# Patient Record
Sex: FEMALE | Race: BLACK OR AFRICAN AMERICAN | NOT HISPANIC OR LATINO | Employment: UNEMPLOYED | RURAL
[De-identification: names, ages, dates, MRNs, and addresses within clinical notes are randomized per-mention and may not be internally consistent; named-entity substitution may affect disease eponyms.]

---

## 2021-05-04 DIAGNOSIS — M25.512 LEFT SHOULDER PAIN: Primary | ICD-10-CM

## 2021-05-18 ENCOUNTER — CLINICAL SUPPORT (OUTPATIENT)
Dept: REHABILITATION | Facility: HOSPITAL | Age: 66
End: 2021-05-18
Payer: MEDICARE

## 2021-05-18 DIAGNOSIS — M25.512 LEFT SHOULDER PAIN: Primary | ICD-10-CM

## 2021-05-18 DIAGNOSIS — G89.29 CHRONIC LEFT SHOULDER PAIN: ICD-10-CM

## 2021-05-18 DIAGNOSIS — M25.512 CHRONIC LEFT SHOULDER PAIN: ICD-10-CM

## 2021-05-18 DIAGNOSIS — R52 PAIN: ICD-10-CM

## 2021-05-18 DIAGNOSIS — R29.898 DECREASED STRENGTH OF UPPER EXTREMITY: ICD-10-CM

## 2021-05-18 PROCEDURE — 97530 THERAPEUTIC ACTIVITIES: CPT

## 2021-05-18 PROCEDURE — 97110 THERAPEUTIC EXERCISES: CPT

## 2021-05-19 ENCOUNTER — CLINICAL SUPPORT (OUTPATIENT)
Dept: REHABILITATION | Facility: HOSPITAL | Age: 66
End: 2021-05-19
Payer: MEDICARE

## 2021-05-19 DIAGNOSIS — R52 PAIN: ICD-10-CM

## 2021-05-19 DIAGNOSIS — R29.898 DECREASED STRENGTH OF UPPER EXTREMITY: ICD-10-CM

## 2021-05-19 PROCEDURE — 97530 THERAPEUTIC ACTIVITIES: CPT

## 2021-05-19 PROCEDURE — 97110 THERAPEUTIC EXERCISES: CPT

## 2021-05-24 ENCOUNTER — CLINICAL SUPPORT (OUTPATIENT)
Dept: REHABILITATION | Facility: HOSPITAL | Age: 66
End: 2021-05-24
Payer: MEDICARE

## 2021-05-24 DIAGNOSIS — R29.898 DECREASED STRENGTH OF UPPER EXTREMITY: ICD-10-CM

## 2021-05-24 DIAGNOSIS — R52 PAIN: ICD-10-CM

## 2021-05-24 PROCEDURE — 97530 THERAPEUTIC ACTIVITIES: CPT

## 2021-05-24 PROCEDURE — 97014 ELECTRIC STIMULATION THERAPY: CPT

## 2021-05-24 PROCEDURE — 97110 THERAPEUTIC EXERCISES: CPT

## 2021-05-27 ENCOUNTER — CLINICAL SUPPORT (OUTPATIENT)
Dept: REHABILITATION | Facility: HOSPITAL | Age: 66
End: 2021-05-27
Payer: MEDICARE

## 2021-05-27 DIAGNOSIS — R29.898 DECREASED STRENGTH OF UPPER EXTREMITY: ICD-10-CM

## 2021-05-27 DIAGNOSIS — R52 PAIN: ICD-10-CM

## 2021-05-27 PROCEDURE — 97014 ELECTRIC STIMULATION THERAPY: CPT

## 2021-05-27 PROCEDURE — 97110 THERAPEUTIC EXERCISES: CPT

## 2021-05-27 PROCEDURE — 97530 THERAPEUTIC ACTIVITIES: CPT

## 2021-06-01 ENCOUNTER — CLINICAL SUPPORT (OUTPATIENT)
Dept: REHABILITATION | Facility: HOSPITAL | Age: 66
End: 2021-06-01
Payer: MEDICARE

## 2021-06-01 DIAGNOSIS — R52 PAIN: ICD-10-CM

## 2021-06-01 DIAGNOSIS — R29.898 DECREASED STRENGTH OF UPPER EXTREMITY: ICD-10-CM

## 2021-06-01 PROCEDURE — 97110 THERAPEUTIC EXERCISES: CPT

## 2021-06-01 PROCEDURE — 97530 THERAPEUTIC ACTIVITIES: CPT

## 2021-09-09 DIAGNOSIS — M54.9 BACK PAIN: Primary | ICD-10-CM

## 2021-09-14 ENCOUNTER — CLINICAL SUPPORT (OUTPATIENT)
Dept: REHABILITATION | Facility: HOSPITAL | Age: 66
End: 2021-09-14
Payer: MEDICARE

## 2021-09-14 DIAGNOSIS — M54.41 ACUTE RIGHT-SIDED LOW BACK PAIN WITH RIGHT-SIDED SCIATICA: ICD-10-CM

## 2021-09-14 DIAGNOSIS — M54.9 BACK PAIN: ICD-10-CM

## 2021-09-14 DIAGNOSIS — M54.9 BACK PAIN, UNSPECIFIED BACK LOCATION, UNSPECIFIED BACK PAIN LATERALITY, UNSPECIFIED CHRONICITY: ICD-10-CM

## 2021-09-14 PROBLEM — M54.50 LOW BACK PAIN: Status: ACTIVE | Noted: 2021-09-14

## 2021-09-14 PROCEDURE — 97140 MANUAL THERAPY 1/> REGIONS: CPT

## 2021-09-14 PROCEDURE — 97014 ELECTRIC STIMULATION THERAPY: CPT

## 2021-09-14 PROCEDURE — 97161 PT EVAL LOW COMPLEX 20 MIN: CPT

## 2021-09-14 PROCEDURE — 97110 THERAPEUTIC EXERCISES: CPT

## 2021-09-16 ENCOUNTER — CLINICAL SUPPORT (OUTPATIENT)
Dept: REHABILITATION | Facility: HOSPITAL | Age: 66
End: 2021-09-16
Payer: MEDICARE

## 2021-09-16 DIAGNOSIS — M54.41 ACUTE RIGHT-SIDED LOW BACK PAIN WITH RIGHT-SIDED SCIATICA: ICD-10-CM

## 2021-09-16 PROCEDURE — 97014 ELECTRIC STIMULATION THERAPY: CPT

## 2021-09-16 PROCEDURE — 97140 MANUAL THERAPY 1/> REGIONS: CPT

## 2021-09-16 PROCEDURE — 97110 THERAPEUTIC EXERCISES: CPT

## 2021-09-21 ENCOUNTER — CLINICAL SUPPORT (OUTPATIENT)
Dept: REHABILITATION | Facility: HOSPITAL | Age: 66
End: 2021-09-21
Payer: MEDICARE

## 2021-09-21 DIAGNOSIS — M54.41 ACUTE RIGHT-SIDED LOW BACK PAIN WITH RIGHT-SIDED SCIATICA: ICD-10-CM

## 2021-09-21 PROCEDURE — 97014 ELECTRIC STIMULATION THERAPY: CPT | Mod: CQ

## 2021-09-21 PROCEDURE — 97110 THERAPEUTIC EXERCISES: CPT | Mod: CQ

## 2021-09-23 ENCOUNTER — CLINICAL SUPPORT (OUTPATIENT)
Dept: REHABILITATION | Facility: HOSPITAL | Age: 66
End: 2021-09-23
Payer: MEDICARE

## 2021-09-23 DIAGNOSIS — M54.41 ACUTE RIGHT-SIDED LOW BACK PAIN WITH RIGHT-SIDED SCIATICA: ICD-10-CM

## 2021-09-23 PROCEDURE — 97014 ELECTRIC STIMULATION THERAPY: CPT

## 2021-09-23 PROCEDURE — 97110 THERAPEUTIC EXERCISES: CPT

## 2021-09-28 ENCOUNTER — CLINICAL SUPPORT (OUTPATIENT)
Dept: REHABILITATION | Facility: HOSPITAL | Age: 66
End: 2021-09-28
Payer: MEDICARE

## 2021-09-28 PROCEDURE — 97014 ELECTRIC STIMULATION THERAPY: CPT | Mod: CQ

## 2021-09-28 PROCEDURE — 97110 THERAPEUTIC EXERCISES: CPT | Mod: CQ

## 2021-09-28 PROCEDURE — 97140 MANUAL THERAPY 1/> REGIONS: CPT | Mod: CQ

## 2021-09-30 ENCOUNTER — CLINICAL SUPPORT (OUTPATIENT)
Dept: REHABILITATION | Facility: HOSPITAL | Age: 66
End: 2021-09-30
Payer: MEDICARE

## 2021-09-30 ENCOUNTER — DOCUMENTATION ONLY (OUTPATIENT)
Dept: REHABILITATION | Facility: HOSPITAL | Age: 66
End: 2021-09-30

## 2021-09-30 PROCEDURE — 97110 THERAPEUTIC EXERCISES: CPT | Mod: CQ

## 2021-09-30 PROCEDURE — 97014 ELECTRIC STIMULATION THERAPY: CPT | Mod: CQ

## 2022-03-21 DIAGNOSIS — M48.00 SPINAL STENOSIS: ICD-10-CM

## 2022-03-21 DIAGNOSIS — M54.16 LUMBAR RADICULOPATHY: Primary | ICD-10-CM

## 2022-03-21 DIAGNOSIS — M16.12 PRIMARY OSTEOARTHRITIS OF LEFT HIP: ICD-10-CM

## 2022-04-01 ENCOUNTER — CLINICAL SUPPORT (OUTPATIENT)
Dept: REHABILITATION | Facility: HOSPITAL | Age: 67
End: 2022-04-01
Payer: MEDICARE

## 2022-04-01 DIAGNOSIS — M48.00 SPINAL STENOSIS: ICD-10-CM

## 2022-04-01 DIAGNOSIS — G89.29 CHRONIC RIGHT-SIDED LOW BACK PAIN WITH RIGHT-SIDED SCIATICA: Primary | ICD-10-CM

## 2022-04-01 DIAGNOSIS — M54.16 LUMBAR RADICULOPATHY: ICD-10-CM

## 2022-04-01 DIAGNOSIS — M54.41 CHRONIC RIGHT-SIDED LOW BACK PAIN WITH RIGHT-SIDED SCIATICA: Primary | ICD-10-CM

## 2022-04-01 DIAGNOSIS — M16.12 PRIMARY OSTEOARTHRITIS OF LEFT HIP: ICD-10-CM

## 2022-04-01 PROCEDURE — 97161 PT EVAL LOW COMPLEX 20 MIN: CPT

## 2022-04-01 PROCEDURE — 97110 THERAPEUTIC EXERCISES: CPT

## 2022-04-01 PROCEDURE — 97014 ELECTRIC STIMULATION THERAPY: CPT

## 2022-04-01 PROCEDURE — 97140 MANUAL THERAPY 1/> REGIONS: CPT

## 2022-04-01 NOTE — PLAN OF CARE
[facility]   Physical Therapy Initial Evaluation    Name: Jina Owen  Clinic Number: [id number]    Therapy Diagnosis:   Encounter Diagnoses   Name Primary?    Lumbar radiculopathy     Primary osteoarthritis of left hip     Spinal stenosis     Chronic right-sided low back pain with right-sided sciatica Yes     Physician: David Carpenter MD    Physician Orders: PT Eval and Treat   Medical Diagnosis from Referral: lumbar radiculopathy   Evaluation Date: 4/1/2022  Authorization Period Expiration: 4/1/2023  Plan of Care Expiration: 5/13/2022  Visit # / Visits authorized: 1/12    Time In: 12:12  Time Out: 13:15  Total Appointment Time (timed & untimed codes): 63 minutes    Precautions: Standard    Subjective   Date of onset: chronic history   History of current condition - Jina reports: after being DC'd from PT in September of last year that she had an MRI followed by a nerve block in December 2021. Patient reports that she was feeling better for a while but the pain started back in February. Patient reports that her pain had started to progress into her L hip and when she returned to the MD she was given an injection in the L hjip approximately 3 weeks ago. She reports R side low back pain with R LE radiating pain into R foot. Patient reports increased pain with bending and sitting. Patient reports that her doctor told her she needed to do PT for now because she can't have another nerve block right now. Patient also reports that the doctors have mentioned back surgery but she does not want to have surgery.      Medical History:   No past medical history on file.    Surgical History:   Jina Owen  has no past surgical history on file.     Medications:   Jina currently has no medications in their medication list.    Allergies:   Review of patient's allergies indicates:  Not on File     Imaging, MRI studies     Prior Therapy: September 2021  Social History:  lives with their family  Occupation:  Retired   Prior Level of Function:  Independent   Current Level of Function: independent with pain and activity limitations     Pain:  Current 6/10, worst 10/10, best 4/10   Location: right low back and LE    Description: Aching, Burning and Throbbing  Aggravating Factors: Sitting  Easing Factors: pain medication    Pts goals: Decrease pain     Objective     Posture:  1. Standing lordosis: Increased  2. Sitting lordosis: Increased  3. Iliac crest height: right increased  4. PSIS height: right increased  5. Pelvic rotation/torsion: yes anterior rotation on R side   6. Scoliosis: no  7. Lateral shift: None     Lumbar AROM: limited in all directions by approximately 25% due to pain.     MANUAL MUSCLE TEST  Right left   Hip flexion MMT number: 4-/5 MMT strength: 4+/5   Hip abduction MMT strength: 4-/5 MMT strength: 4+/5   Knee extension MMT strength: 4/5 MMT strength: 4+/5   Knee flexion  MMT strength: 4/5 MMT strength: 4+/5   Ankle dorsiflexion MMT strength: 4+/5 MMT strength: 4+/5   Ankle plantar flexion  MMT strength: 4+/5 MMT strength: 4+/5   Extensor hallucis longus MMT strength: 4+/5 MMT strength: 4+/5     ROM/flexibility right left   Hip flexion (120) -15 -10     Special test Right  Left    SLR test < 60 degrees Negative Negative   SLR test > 60 degrees Positive Negative   Sitting slump test Positive Negative   Piriformis test Negative Negative   BRI test Negative Negative   SI forward bend Negative Negative   SI distraction Negative Negative   SI compression Positive Negative     Spinal mobility:  Segment: Decreased lumbar and sacral mobility with anterior/posterior mobilizations.     Palpation: Increased lumbar paraspinal and QL muscle tightness.       TREATMENT   Treatment Time In: 12:22  Treatment Time Out: 13:15  Total Treatment time (time-based codes) separate from Evaluation: 53 minutes    Jina received the treatments listed below:  THERAPEUTIC EXERCISES to develop strength, flexibility, posture and  "core stabilization for 33 minutes including :    Ther-Ex REPS   Nustep 8 mins    Wedge 1 minute    Hamstring Stretch 3 x 30"    Posterior Pelvic Tilt  30 x 3"    Lower Trunk Rotations 5 x 10" each side    Single Knee to Chest Stretch 5 x 10" each side    Piriformis Stretch 2 x 30" Each side      MANUAL THERAPY TECHNIQUES including active neuromuscular releases to correct R side pelvic malalignment and decrease muscle tightness in R lumbar muscles to decrease pain were applied  for 10 minutes.    SUPERVISED MODALITIES after being cleared for contraindications:  TENS:  Jina received Biphasic electrical stimulation for pain to the B lumbo sacral muscles. Pt received burst mode at a rate of 2 pps for 10 minutes. Jina tolerated treatment well without any adverse effects.     HOT PACK  for 10 minutes to B lumbosacral muscles.    Home Exercises and Patient Education Provided        Education provided:   PT provided patient on importance of continuing HEP at home to maintain improvements achieved with PT interventions. PT also educated patient on proper body mechanics for supine to sit transfers.     Written Home Exercises Provided: yes.  Exercises were reviewed and Jina was able to demonstrate them prior to the end of the session.  Jina demonstrated good  understanding of the education provided.     See EMR under Patient Instructions for exercises provided 4/1/2022.    Assessment   Jina is a 67 y.o. female referred to outpatient Physical Therapy with a medical diagnosis of lumbar radiculopathy. Pt presents with low back pain, R LE radicular pain, core and hip muscle weakness, muscle tightness. Patient's impairments have limited her overall activity tolerance making it difficult to complete household tasks at times.     Pt prognosis is Fair.   Pt will benefit from skilled outpatient Physical Therapy to address the deficits stated above and in the chart below, provide pt/family education, and to maximize pt's level of " independence.     PT provided patient with verbal and visual cueing for form with exercises included in treatment session. Patient reports increased R lumbar  pain following L trunk rotations. PT used moderate effort for manual innominate correction and noted improved alignment following manual therapy interventions. Patient had no other reports of other adverse effects during treatment.     Plan of care discussed with patient: Yes  Pt's spiritual, cultural and educational needs considered and patient is agreeable to the plan of care and goals as stated below:     Anticipated Barriers for therapy: chronicity of pain, lack of improvements with recent episode of PT treatments.     Goals:  1. Patient will report decrease in pain to 2/10 to improve quality of life  2. Patient will report decrease in radiating occurrences from 50%  to less than or equal to 25% to allow patient the ability to perform activities of daily living   3. Patient will increase R lower extremity strength to 4+/5 to improve ambulation ability  4. Patient will increase B hamstring 90/90 by 10 degrees    Plan   Plan of care Certification: 4/1/2022 to 5/13/2022.    Outpatient Physical Therapy 2 times weekly for 6 weeks to include the following interventions: Electrical Stimulation unattend, Manual Therapy, Moist Heat/ Ice, Patient Education, Therapeutic Activities and Therapeutic Exercise.     Nadja Espino, PT, DPT     Physician Signature : ____________________________________________________  Date:_______________________________________________

## 2022-04-04 ENCOUNTER — CLINICAL SUPPORT (OUTPATIENT)
Dept: REHABILITATION | Facility: HOSPITAL | Age: 67
End: 2022-04-04
Payer: MEDICARE

## 2022-04-04 PROCEDURE — 97140 MANUAL THERAPY 1/> REGIONS: CPT | Mod: CQ

## 2022-04-04 PROCEDURE — 97014 ELECTRIC STIMULATION THERAPY: CPT | Mod: CQ

## 2022-04-04 PROCEDURE — 97110 THERAPEUTIC EXERCISES: CPT | Mod: CQ

## 2022-04-07 ENCOUNTER — CLINICAL SUPPORT (OUTPATIENT)
Dept: REHABILITATION | Facility: HOSPITAL | Age: 67
End: 2022-04-07
Payer: MEDICARE

## 2022-04-07 DIAGNOSIS — M54.41 CHRONIC RIGHT-SIDED LOW BACK PAIN WITH RIGHT-SIDED SCIATICA: Primary | ICD-10-CM

## 2022-04-07 DIAGNOSIS — G89.29 CHRONIC RIGHT-SIDED LOW BACK PAIN WITH RIGHT-SIDED SCIATICA: Primary | ICD-10-CM

## 2022-04-07 PROBLEM — R52 PAIN: Status: RESOLVED | Noted: 2021-05-18 | Resolved: 2022-04-07

## 2022-04-07 PROBLEM — R29.898 DECREASED STRENGTH OF UPPER EXTREMITY: Status: RESOLVED | Noted: 2021-05-18 | Resolved: 2022-04-07

## 2022-04-07 PROCEDURE — 97110 THERAPEUTIC EXERCISES: CPT

## 2022-04-07 PROCEDURE — 97014 ELECTRIC STIMULATION THERAPY: CPT

## 2022-04-07 PROCEDURE — 97140 MANUAL THERAPY 1/> REGIONS: CPT

## 2022-04-07 NOTE — PROGRESS NOTES
"[facility]   Physical Therapy Treatment Note     Name: Jina Owen  Clinic Number: [id number]      Visit Date: 4/7/2022  Therapy Diagnosis:        Encounter Diagnoses   Name Primary?    Lumbar radiculopathy      Primary osteoarthritis of left hip      Spinal stenosis      Chronic right-sided low back pain with right-sided sciatica Yes      Physician: David Carpenter MD     Physician Orders: PT Eval and Treat   Medical Diagnosis from Referral: lumbar radiculopathy   Evaluation Date: 4/1/2022  Authorization Period Expiration: 4/1/2023  Plan of Care Expiration: 5/13/2022  Visit # / Visits authorized: 3/12     Time In: 12:00   Time Out: 12:57  Total Appointment Time (timed & untimed codes):  57 minutes   Precautions: Standard      PTA Visit #: 0/5    SUBJECTIVE     Pt reports: "Debbi gave me a new walk."      She was partially compliant with home exercise program.  Response to previous treatment: No adverse effects reported  Functional change: Early in POC     Pain: 5/10  Location:  Low back, Right hip radiating down into Right LE     OBJECTIVE     Objective Measures updated at progress report unless specified.     Treatment     Jina received the treatments listed below:      THERAPEUTIC EXERCISES to develop strength, flexibility, posture and core stabilization.  See flow-sheet below:   Increased reps with previously instructed exercises as tolerated by patient.       Ther-Ex REPS   Nustep 10 minutes    Wedge 2 minutes    Hamstring Stretch 3 x 30"    Posterior Pelvic Tilt  30 x 3"    Lower Trunk Rotations 5 x 10" each side    Single Knee to Chest Stretch 5 x 10" each side    Piriformis Stretch  3 x 30" each side    Figure Four Stretch  3 x 30" Each side       MANUAL THERAPY TECHNIQUES including active neuromuscular releases and muscle energy technique  to correct L pelvic upslip and decrease muscle tightness in R lumbar muscles .     SUPERVISED MODALITIES after being cleared for " contraindications:  TENS:  Jina received Biphasic electrical stimulation for pain to the B lumbo sacral muscles. Pt received burst mode at a rate of 2 pps for 10 minutes. Jina tolerated treatment well without any adverse effects.      HOT PACK  for 10 minutes to B lumbosacral muscles.        Patient Education and Home Exercises     Home Exercises Provided and Patient Education Provided     Education provided: KATEY       Written Home Exercises Provided: Patient instructed to cont prior HEP. Exercises were reviewed and Jina was able to demonstrate them prior to the end of the session.  Jina demonstrated good  understanding of the education provided. See EMR under Patient Instructions for exercises provided during therapy sessions    ASSESSMENT     Jina is a 67 y.o. female referred to outpatient Physical Therapy with a medical diagnosis of lumbar radiculopathy. Pt presents with low back pain, R LE radicular pain, core and hip muscle weakness, muscle tightness.   Patient requires mod cues from PT to complete Ther Ex with proper alignment, hold times, and speed of movement.  Patient does not report increased pain during tx, but states her upper thoracic pain is still present post stretches. PT performed manuals with reported min decrease in symptoms. No adverse effects noted.     Pt prognosis is Fair.     Pt will continue to benefit from skilled outpatient physical therapy to address the deficits listed in the problem list box on initial evaluation, provide pt/family education and to maximize pt's level of independence in the home and community environment.     Pt's spiritual, cultural and educational needs considered and pt agreeable to plan of care and goals.     Anticipated barriers to physical therapy: chronicity of pain, lack of improvements with recent episode of PT treatments.     Goals:   1. Patient will report decrease in pain to 2/10 to improve quality of life  2. Patient will report decrease in radiating  occurrences from 50%  to less than or equal to 25% to allow patient the ability to perform activities of daily living   3. Patient will increase R lower extremity strength to 4+/5 to improve ambulation ability  4. Patient will increase B hamstring 90/90 by 10 degrees      PLAN     Continue POC Per PT order to progress patient toward rehab goals as tolerated by patient.      Ofelia Maldonado, PT, DPT     4/7/2022

## 2022-04-11 ENCOUNTER — CLINICAL SUPPORT (OUTPATIENT)
Dept: REHABILITATION | Facility: HOSPITAL | Age: 67
End: 2022-04-11
Payer: MEDICARE

## 2022-04-11 PROCEDURE — 97110 THERAPEUTIC EXERCISES: CPT | Mod: CQ

## 2022-04-11 PROCEDURE — 97014 ELECTRIC STIMULATION THERAPY: CPT | Mod: CQ

## 2022-04-11 PROCEDURE — 97140 MANUAL THERAPY 1/> REGIONS: CPT | Mod: CQ

## 2022-04-13 ENCOUNTER — CLINICAL SUPPORT (OUTPATIENT)
Dept: REHABILITATION | Facility: HOSPITAL | Age: 67
End: 2022-04-13
Payer: MEDICARE

## 2022-04-13 PROCEDURE — 97110 THERAPEUTIC EXERCISES: CPT

## 2022-04-13 PROCEDURE — 97014 ELECTRIC STIMULATION THERAPY: CPT

## 2022-04-13 PROCEDURE — 97140 MANUAL THERAPY 1/> REGIONS: CPT

## 2022-04-13 NOTE — PROGRESS NOTES
"[facility]   Physical Therapy Treatment Note     Name: Jina Owen  Clinic Number: [id number]      Visit Date: 4/13/2022  Therapy Diagnosis:        Encounter Diagnoses   Name Primary?    Lumbar radiculopathy      Primary osteoarthritis of left hip      Spinal stenosis      Chronic right-sided low back pain with right-sided sciatica Yes      Physician: David Carpenter MD     Physician Orders: PT Eval and Treat   Medical Diagnosis from Referral: lumbar radiculopathy   Evaluation Date: 4/1/2022  Authorization Period Expiration: 4/1/2023  Plan of Care Expiration: 5/13/2022  Visit # / Visits authorized: 5/12     Time In: 12:05  Time Out: 13:14  Total Appointment Time (timed & untimed codes):  69 minutes total  Precautions: Standard      PTA Visit #: 0/5    SUBJECTIVE     Pt reports: "I feel pretty good, I just have some pain down my R LE. My toes were cramping all night after my tx Monday."     She was partially compliant with home exercise program.  Response to previous treatment: No adverse effects reported  Functional change: Early in POC     Pain: 3/10  Location:  Low back, Right hip radiating down into Right LE     OBJECTIVE     Objective Measures updated at progress report unless specified.     Treatment     Jina received the treatments listed below:      THERAPEUTIC EXERCISES to develop strength, flexibility, posture and core stabilization.  See flow-sheet below:     Ther-Ex REPS   Nustep 10 minutes    Wedge 2 minutes    Hamstring Stretch 3 x 30"    Posterior Pelvic Tilt  30 x 3"    Lower Trunk Rotations 5 x 10" each side    Single Knee to Chest Stretch 5 x 10" each side    Piriformis Stretch  3 x 30" each side    Figure Four Stretch  3 x 30" Each side       MANUAL THERAPY TECHNIQUES including active neuromuscular releases and muscle energy techniques to correct Right Pelvic anterior rotation. Palpation for assessment of pelvic rotation prior to and following manual " techniques.     SUPERVISED MODALITIES after being cleared for contraindications:  TENS:  Jina received Biphasic electrical stimulation for pain to the Right  lumbo sacral muscles, and Right calf and Hamstring.  Pt received burst mode at a rate of 2 pps for 10 minutes. Jina tolerated treatment well without any adverse effects.      HOT PACK  for 10 minutes to B lumbosacral muscles.    Patient Education and Home Exercises     Home Exercises Provided and Patient Education Provided     Education provided: KATEY       Written Home Exercises Provided: Patient instructed to cont prior HEP. Exercises were reviewed and Jina was able to demonstrate them prior to the end of the session.  Jina demonstrated good  understanding of the education provided. See EMR under Patient Instructions for exercises provided during therapy sessions    ASSESSMENT     Jina is a 67 y.o. female referred to outpatient Physical Therapy with a medical diagnosis of lumbar radiculopathy. Pt presents with low back pain, R LE radicular pain, core and hip muscle weakness, muscle tightness. Pt displayed increased competence of previous tx session. Pt c/o cramping down RLE during hamstring stretch. Pt required min cues from SPTA to complete Ther Ex with proper alignment, hold times, and speed of movement.  Patient without c/o increased pain during Ther-Ex or manual therapy.  Pt completed tx without any adverse effects.      Pt prognosis is Fair.     Pt will continue to benefit from skilled outpatient physical therapy to address the deficits listed in the problem list box on initial evaluation, provide pt/family education and to maximize pt's level of independence in the home and community environment.     Pt's spiritual, cultural and educational needs considered and pt agreeable to plan of care and goals.     Anticipated barriers to physical therapy: chronicity of pain, lack of improvements with recent episode of PT treatments.     Goals:   1. Patient will  report decrease in pain to 2/10 to improve quality of life  2. Patient will report decrease in radiating occurrences from 50%  to less than or equal to 25% to allow patient the ability to perform activities of daily living   3. Patient will increase R lower extremity strength to 4+/5 to improve ambulation ability  4. Patient will increase B hamstring 90/90 by 10 degrees      PLAN     Continue POC Per PT order to progress patient toward rehab goals as tolerated by patient.      Luis Colmenares, SPTA  Nadja Espino, PT,     4/13/2022

## 2022-04-18 ENCOUNTER — CLINICAL SUPPORT (OUTPATIENT)
Dept: REHABILITATION | Facility: HOSPITAL | Age: 67
End: 2022-04-18
Payer: MEDICARE

## 2022-04-18 PROCEDURE — 97014 ELECTRIC STIMULATION THERAPY: CPT | Mod: CQ

## 2022-04-18 PROCEDURE — 97110 THERAPEUTIC EXERCISES: CPT | Mod: CQ

## 2022-04-18 PROCEDURE — 97140 MANUAL THERAPY 1/> REGIONS: CPT | Mod: CQ

## 2022-04-18 NOTE — PROGRESS NOTES
"[facility]   Physical Therapy Treatment Note     Name: Jina Owen  [facility] Number: [id number]      Visit Date: 4/18/2022  Therapy Diagnosis:        Encounter Diagnoses   Name Primary?    Lumbar radiculopathy      Primary osteoarthritis of left hip      Spinal stenosis      Chronic right-sided low back pain with right-sided sciatica Yes      Physician: David Carpenter MD     Physician Orders: PT Eval and Treat   Medical Diagnosis from Referral: lumbar radiculopathy   Evaluation Date: 4/1/2022  Authorization Period Expiration: 4/1/2023  Plan of Care Expiration: 5/13/2022  Visit # / Visits authorized: 6/12     Time In: 9:04  Time Out: 10:15  Total Appointment Time (timed & untimed codes):  71 minutes   Precautions: Standard      PTA Visit #: 1/5    SUBJECTIVE     Pt reports: "I'm feeling a little something in my ankle (Right), but I had family over for Easter, and was up on my feet a lot over the weekend".     She was partially compliant with home exercise program.  Response to previous treatment: No adverse effects reported  Functional change: Early in POC     Pain: 3/10  Location:  Low back, Right hip radiating down into R LE      OBJECTIVE     Objective Measures updated at progress report unless specified.     Treatment     Jina received the treatments listed below:      THERAPEUTIC EXERCISES to develop strength, flexibility, posture and core stabilization.  See flow-sheet below:   Added Sciatic Nerve Glides and Seated Calf stretch with Stretch Rite.     Ther-Ex REPS   Nustep 10 minutes    Wedge 2 minutes    Hamstring Stretch 3 x 30"    Sciatic Nerve Glides  2 x 10, 3" *    Seated calf stretch with Stretch Rite  3 x 20" each *    Posterior Pelvic Tilt  30 x 3"    Lower Trunk Rotations 5 x 10" each side    Single Knee to Chest Stretch 5 x 10" each side    Piriformis Stretch  3 x 30" each side    Figure Four Stretch  3 x 30" Each side       MANUAL THERAPY TECHNIQUES including active " "neuromuscular releases and muscle energy techniques to correct Left Pelvic Up-slip.  Palpation for assessment of pelvic rotation prior to and following manual techniques.     SUPERVISED MODALITIES after being cleared for contraindications:  TENS:  Jina received Biphasic electrical stimulation for pain to the Right  lumbo sacral muscles, and Right calf and Hamstring.  Pt received burst mode at a rate of 2 pps for 10 minutes. Jina tolerated treatment well without any adverse effects.      HOT PACK  for 10 minutes to B lumbosacral muscles.    Patient Education and Home Exercises     Home Exercises Provided and Patient Education Provided     Education provided: KATEY       Written Home Exercises Provided: Patient instructed to cont prior HEP. Exercises were reviewed and Jina was able to demonstrate them prior to the end of the session.  Jina demonstrated good  understanding of the education provided. See EMR under Patient Instructions for exercises provided during therapy sessions    ASSESSMENT     Jina is a 67 y.o. female referred to outpatient Physical Therapy with a medical diagnosis of lumbar radiculopathy. Pt presents with low back pain, R LE radicular pain, core and hip muscle weakness, muscle tightness.  Patient is slow progressing through Ther Ex, but is attentive to count and hold times.  Patient reports tenderness in Left Quadratus Lumborum, Piriformis, and Glut Min during manual therapy.  After manual therapy is complete at end of PT treatment session, patient states "I feel good right now.  I feel like I can get some things done today now".  Patient denies complaints of pain and increased s/s.       Pt prognosis is Fair.     Pt will continue to benefit from skilled outpatient physical therapy to address the deficits listed in the problem list box on initial evaluation, provide pt/family education and to maximize pt's level of independence in the home and community environment.     Pt's spiritual, cultural and " educational needs considered and pt agreeable to plan of care and goals.     Anticipated barriers to physical therapy: chronicity of pain, lack of improvements with recent episode of PT treatments.     Goals:   1. Patient will report decrease in pain to 2/10 to improve quality of life  2. Patient will report decrease in radiating occurrences from 50%  to less than or equal to 25% to allow patient the ability to perform activities of daily living   3. Patient will increase R lower extremity strength to 4+/5 to improve ambulation ability  4. Patient will increase B hamstring 90/90 by 10 degrees      PLAN     Continue POC Per PT order to progress patient toward rehab goals as tolerated by patient.    Debbi Mclean, PTA, 4/18/2022

## 2022-04-27 ENCOUNTER — CLINICAL SUPPORT (OUTPATIENT)
Dept: REHABILITATION | Facility: HOSPITAL | Age: 67
End: 2022-04-27
Payer: MEDICARE

## 2022-04-27 PROCEDURE — 97014 ELECTRIC STIMULATION THERAPY: CPT | Mod: CQ

## 2022-04-27 PROCEDURE — 97140 MANUAL THERAPY 1/> REGIONS: CPT | Mod: CQ

## 2022-04-27 PROCEDURE — 97110 THERAPEUTIC EXERCISES: CPT | Mod: CQ

## 2022-04-27 NOTE — PROGRESS NOTES
"[facility]   Physical Therapy Treatment Note     Name: Jina Owen  Clinic Number: [id number]      Visit Date: 4/27/2022  Therapy Diagnosis:        Encounter Diagnoses   Name Primary?    Lumbar radiculopathy      Primary osteoarthritis of left hip      Spinal stenosis      Chronic right-sided low back pain with right-sided sciatica Yes      Physician: David Carpenter MD     Physician Orders: PT Eval and Treat   Medical Diagnosis from Referral: lumbar radiculopathy   Evaluation Date: 4/1/2022  Authorization Period Expiration: 4/1/2023  Plan of Care Expiration: 5/13/2022  Visit # / Visits authorized: 6/12     Time In: 9:17  Time Out: 10:30  Total Appointment Time (timed & untimed codes):  73 minutes , 55 billable   Precautions: Standard      PTA Visit #: 2/5    SUBJECTIVE     Pt reports:  "My back feels okay.  The biggest problem I am having is my leg (R) hurting at times. It starts on the outside of my hip and goes all the way down".           She was partially compliant with home exercise program.  Response to previous treatment: No adverse effects reported  Functional change: Early in POC     Pain: 3/10  Location:  Low back, Right hip radiating down into R LE      OBJECTIVE     Objective Measures updated at progress report unless specified.     Treatment     Jina received the treatments listed below:      THERAPEUTIC EXERCISES to develop strength, flexibility, posture and core stabilization.  See flow-sheet below:       Ther-Ex REPS   Nustep 10 minutes    Wedge 2 minutes    Hamstring Stretch 3 x 30"    Sciatic Nerve Glides  2 x 10, 3" *    Seated calf stretch with Stretch Rite  3 x 20" each *    Posterior Pelvic Tilt  30 x 3"    Lower Trunk Rotations 5 x 10" each side    Single Knee to Chest Stretch 5 x 10" each side    Piriformis Stretch  3 x 30" each side    Figure Four Stretch  3 x 30" Each side       MANUAL THERAPY TECHNIQUES  Palpation for assessment of pelvis revealed no " "pelvic mal-alignments.  STM/MFR to Right IT Band, TFL,  and calf to decrease MYF adhesions and possible trigger points.      SUPERVISED MODALITIES after being cleared for contraindications:  TENS:  Jina received Biphasic electrical stimulation for pain to the Right  Lateral thigh and lower leg.  Pt received burst mode at a rate of 2 pps for 10 minutes. Jina tolerated treatment well without any adverse effects.      HOT PACK  for 10 minutes to Right LE with Biphasic E-stim.      Patient Education and Home Exercises     Home Exercises Provided and Patient Education Provided     Education provided: KATEY       Written Home Exercises Provided: Patient instructed to cont prior HEP. Exercises were reviewed and Jina was able to demonstrate them prior to the end of the session.  Jina demonstrated good  understanding of the education provided. See EMR under Patient Instructions for exercises provided during therapy sessions    ASSESSMENT     Jina is a 67 y.o. female referred to outpatient Physical Therapy with a medical diagnosis of lumbar radiculopathy. Pt presents with low back pain, R LE radicular pain, core and hip muscle weakness, muscle tightness.  Patient able to progress through completion of Ther Ex with improved competence and less time and effort.  Patient reports discomfort during manual therapy, but at end of PT treatment session states "I do believe my leg feels better".      Pt prognosis is Fair.     Pt will continue to benefit from skilled outpatient physical therapy to address the deficits listed in the problem list box on initial evaluation, provide pt/family education and to maximize pt's level of independence in the home and community environment.     Pt's spiritual, cultural and educational needs considered and pt agreeable to plan of care and goals.     Anticipated barriers to physical therapy: chronicity of pain, lack of improvements with recent episode of PT treatments.     Goals:   1. Patient will " report decrease in pain to 2/10 to improve quality of life  2. Patient will report decrease in radiating occurrences from 50%  to less than or equal to 25% to allow patient the ability to perform activities of daily living   3. Patient will increase R lower extremity strength to 4+/5 to improve ambulation ability  4. Patient will increase B hamstring 90/90 by 10 degrees      PLAN     Continue POC Per PT order to progress patient toward rehab goals as tolerated by patient.    Debbi Mclean, PTA, 4/27/2022

## 2022-05-02 ENCOUNTER — CLINICAL SUPPORT (OUTPATIENT)
Dept: REHABILITATION | Facility: HOSPITAL | Age: 67
End: 2022-05-02
Payer: MEDICARE

## 2022-05-02 PROCEDURE — 97014 ELECTRIC STIMULATION THERAPY: CPT

## 2022-05-02 PROCEDURE — 97110 THERAPEUTIC EXERCISES: CPT

## 2022-05-02 PROCEDURE — 97140 MANUAL THERAPY 1/> REGIONS: CPT

## 2022-05-02 NOTE — PROGRESS NOTES
"[facility]   Physical Therapy Treatment Note     Name: Jina Owen  Clinic Number: [id number]      Visit Date: 5/2/2022  Therapy Diagnosis:        Encounter Diagnoses   Name Primary?    Lumbar radiculopathy      Primary osteoarthritis of left hip      Spinal stenosis      Chronic right-sided low back pain with right-sided sciatica Yes      Physician: David Carpenter MD     Physician Orders: PT Eval and Treat   Medical Diagnosis from Referral: lumbar radiculopathy   Evaluation Date: 4/1/2022  Authorization Period Expiration: 4/1/2023  Plan of Care Expiration: 5/13/2022  Visit # / Visits authorized: 7/12     Time In: 9:05  Time Out: 10:16   Total Appointment Time (timed & untimed codes):  71 minutes   Precautions: Standard      PTA Visit #: 0/5    SUBJECTIVE     Pt reports:  "My back is okay but I am still having that pain down my R leg."          She was partially compliant with home exercise program.  Response to previous treatment: No adverse effects reported  Functional change: Early in POC     Pain: 3/10  Location:  Low back, Right hip radiating down into R LE      OBJECTIVE     Objective Measures updated at progress report unless specified.     Treatment     Jina received the treatments listed below:      THERAPEUTIC EXERCISES to develop strength, flexibility, posture and core stabilization.  See flow-sheet below:       Ther-Ex REPS   Nustep 10 minutes    Wedge 2 minutes    Hamstring Stretch 3 x 30"    Sciatic Nerve Glides  2 x 10, 3" *    Seated calf stretch with Stretch Rite  3 x 20" each *    Posterior Pelvic Tilt  30 x 3"    Lower Trunk Rotations 5 x 10" each side    Single Knee to Chest Stretch 5 x 10" each side    Piriformis Stretch  3 x 30" each side    Figure Four Stretch  3 x 30" Each side       MANUAL THERAPY TECHNIQUES  Palpation for assessment of pelvis revealed no pelvic mal-alignments. PT noted increased tissue tightness in R QL, glute, piriformis, and R " "hamstring to decrease tissue tightness and irritation.      SUPERVISED MODALITIES after being cleared for contraindications:  TENS:  Jina received Biphasic electrical stimulation for pain to the Right  Lateral thigh and lower leg.  Pt received burst mode at a rate of 2 pps for 10 minutes. Jina tolerated treatment well without any adverse effects.      HOT PACK  for 10 minutes to Right LE with Biphasic E-stim.      Patient Education and Home Exercises     Home Exercises Provided and Patient Education Provided     Education provided: ELIESERS       Written Home Exercises Provided: Patient instructed to cont prior HEP. Exercises were reviewed and Jina was able to demonstrate them prior to the end of the session.  Jina demonstrated good  understanding of the education provided. See EMR under Patient Instructions for exercises provided during therapy sessions    ASSESSMENT     Jina is a 67 y.o. female referred to outpatient Physical Therapy with a medical diagnosis of lumbar radiculopathy. Pt presents with low back pain, R LE radicular pain, core and hip muscle weakness, muscle tightness.  Patient demonstrates good carryover of exercise instruction from previous treatment. Patient continues to report a tight "swollen" feeling in R leg throughout treatment. PT didn't note and R LE edema. Following manual therapy patient reported I feel okay.     Pt prognosis is Fair.     Pt will continue to benefit from skilled outpatient physical therapy to address the deficits listed in the problem list box on initial evaluation, provide pt/family education and to maximize pt's level of independence in the home and community environment.     Pt's spiritual, cultural and educational needs considered and pt agreeable to plan of care and goals.     Anticipated barriers to physical therapy: chronicity of pain, lack of improvements with recent episode of PT treatments.     Goals:   1. Patient will report decrease in pain to 2/10 to improve " quality of life  2. Patient will report decrease in radiating occurrences from 50%  to less than or equal to 25% to allow patient the ability to perform activities of daily living   3. Patient will increase R lower extremity strength to 4+/5 to improve ambulation ability  4. Patient will increase B hamstring 90/90 by 10 degrees      PLAN     Continue POC Per PT order to progress patient toward rehab goals as tolerated by patient.    Nadja Espino, PT,DPT   5/2/2022

## 2022-05-11 ENCOUNTER — CLINICAL SUPPORT (OUTPATIENT)
Dept: REHABILITATION | Facility: HOSPITAL | Age: 67
End: 2022-05-11
Payer: MEDICARE

## 2022-05-11 PROCEDURE — 97110 THERAPEUTIC EXERCISES: CPT | Mod: CQ

## 2022-05-11 PROCEDURE — 97140 MANUAL THERAPY 1/> REGIONS: CPT | Mod: CQ

## 2022-05-11 PROCEDURE — 97014 ELECTRIC STIMULATION THERAPY: CPT | Mod: CQ

## 2022-05-11 NOTE — PROGRESS NOTES
"[facility]   Physical Therapy Treatment Note     Name: Jina Owen  Clinic Number: [id number]      Visit Date: 5/11/2022  Therapy Diagnosis:        Encounter Diagnoses   Name Primary?    Lumbar radiculopathy      Primary osteoarthritis of left hip      Spinal stenosis      Chronic right-sided low back pain with right-sided sciatica Yes      Physician: David Carpenter MD     Physician Orders: PT Eval and Treat   Medical Diagnosis from Referral: lumbar radiculopathy   Evaluation Date: 4/1/2022  Authorization Period Expiration: 4/1/2023  Plan of Care Expiration: 5/13/2022  Visit # / Visits authorized: 8/12     Time In: 13:10 (waiting on Nustep to begin treatment)   Time Out: 14:20  Total Appointment Time (timed & untimed codes):  70 minutes   Precautions: Standard      PTA Visit #: 1/5    SUBJECTIVE     Pt reports: No present pain upon arrival to physical therapy, but reports she had pain over the weekend.  Patient reports pain in low back over the weekend, and rates pain level 7/10.  Patient states pain level decreased after resting and stretching.        She was partially compliant with home exercise program.  Response to previous treatment: No adverse effects reported  Functional change: Early in POC     Pain: 0/10  Location:  Low back, Right hip radiating down into R LE      OBJECTIVE     Objective Measures updated at progress report unless specified.     Treatment     Jina received the treatments listed below:      THERAPEUTIC EXERCISES to develop strength, flexibility, posture and core stabilization.  See flow-sheet below:   Added supine SLR with core stabilization.     Ther-Ex REPS   Nustep 10 minutes    Wedge 2 minutes    Hamstring Stretch 3 x 30"    Sciatic Nerve Glides  2 x 10, 3"    Seated calf stretch with Stretch Rite  3 x 20" each    Posterior Pelvic Tilt  30 x 3"    Lower Trunk Rotations 5 x 10" each side    Single Knee to Chest Stretch 5 x 10" each side    Piriformis " "Stretch  3 x 30" each side    Supine SLR (bilateral LE's)  10 x each *    Figure Four Stretch  3 x 30" Each side       MANUAL THERAPY TECHNIQUES  Palpation for assessment of pelvis revealed Left pelvic Up-slip.  Muscle energy technique used to correct pelvic alignment post ANR's to decrease trigger points and muscle tightness in Left Glut Min, Quadratus Lumborum, and Piriformis.       SUPERVISED MODALITIES after being cleared for contraindications:  TENS:  Jina received Biphasic electrical stimulation for pain to the Right  Lateral thigh and lower leg.  Pt received burst mode at a rate of 2 pps for 10 minutes. Jina tolerated treatment well without any adverse effects.      HOT PACK  for 10 minutes to Right LE with Biphasic E-stim.      Patient Education and Home Exercises     Home Exercises Provided and Patient Education Provided     Education provided: KATEY       Written Home Exercises Provided: Patient instructed to cont prior HEP. Exercises were reviewed and Jina was able to demonstrate them prior to the end of the session.  Jina demonstrated good  understanding of the education provided. See EMR under Patient Instructions for exercises provided during therapy sessions    ASSESSMENT     Jina is a 67 y.o. female referred to outpatient Physical Therapy with a medical diagnosis of lumbar radiculopathy. Pt presents with low back pain, R LE radicular pain, core and hip muscle weakness, muscle tightness.  Patient requires cues x 3 to correct supine SLR with core activation with proper form, count, and hold times. Patient progresses through LE and lower trunk stretches with improved ease of movement and knowledge for correct form.  At end of physical therapy treatment session, patient states "I feel good right now.  I think I may go work in my garden".          Pt prognosis is Fair.     Pt will continue to benefit from skilled outpatient physical therapy to address the deficits listed in the problem list box on initial " evaluation, provide pt/family education and to maximize pt's level of independence in the home and community environment.     Pt's spiritual, cultural and educational needs considered and pt agreeable to plan of care and goals.     Anticipated barriers to physical therapy: chronicity of pain, lack of improvements with recent episode of PT treatments.     Goals:   1. Patient will report decrease in pain to 2/10 to improve quality of life  2. Patient will report decrease in radiating occurrences from 50%  to less than or equal to 25% to allow patient the ability to perform activities of daily living   3. Patient will increase R lower extremity strength to 4+/5 to improve ambulation ability  4. Patient will increase B hamstring 90/90 by 10 degrees      PLAN     Continue POC Per PT order to progress patient toward rehab goals as tolerated by patient.    Debbi Mclean, PTA   5/11/2022

## 2022-05-13 ENCOUNTER — CLINICAL SUPPORT (OUTPATIENT)
Dept: REHABILITATION | Facility: HOSPITAL | Age: 67
End: 2022-05-13
Payer: MEDICARE

## 2022-05-13 PROCEDURE — 97110 THERAPEUTIC EXERCISES: CPT

## 2022-05-13 NOTE — PROGRESS NOTES
"[facility]   Physical Therapy Treatment Note     Name: Jina Owen  Clinic Number: [id number]      Visit Date: 5/13/2022  Therapy Diagnosis:        Encounter Diagnoses   Name Primary?    Lumbar radiculopathy      Primary osteoarthritis of left hip      Spinal stenosis      Chronic right-sided low back pain with right-sided sciatica Yes      Physician: David Carpenter MD     Physician Orders: PT Eval and Treat   Medical Diagnosis from Referral: lumbar radiculopathy   Evaluation Date: 4/1/2022  Authorization Period Expiration: 4/1/2023  Plan of Care Expiration: 5/13/2022  Visit # / Visits authorized: 9/12     Time In: 12:00   Time Out:   Total Appointment Time (timed & untimed codes):   minutes   Precautions: Standard      PTA Visit #: 0/5    SUBJECTIVE     Pt reports: Pt states, "I feel pretty good today."    She was partially compliant with home exercise program.  Response to previous treatment: No adverse effects reported  Functional change: Early in POC     Pain: 0/10  Location:  Low back, Right hip radiating down into R LE      OBJECTIVE     Objective Measures updated at progress report unless specified.     Treatment     Jina received the treatments listed below:      THERAPEUTIC EXERCISES to develop strength, flexibility, posture and core stabilization.  See flow-sheet below:   Added supine SLR with core stabilization.     Ther-Ex REPS   Nustep 10 minutes    Wedge 2 minutes    Hamstring Stretch 3 x 30"    Sciatic Nerve Glides  2 x 10, 3"    Seated calf stretch with Stretch Rite  3 x 20" each    Posterior Pelvic Tilt  30 x 3"    Lower Trunk Rotations 5 x 10" each side    Single Knee to Chest Stretch 5 x 10" each side    Piriformis Stretch  3 x 30" each side    Supine SLR (bilateral LE's)  10 x each *    Figure Four Stretch  3 x 30" Each side       HOT PACK  for 10 minutes to Right LE with Biphasic E-stim.      Patient Education and Home Exercises     Home Exercises Provided " and Patient Education Provided     Education provided: KATEY     Written Home Exercises Provided: Patient instructed to cont prior HEP. Exercises were reviewed and Jina was able to demonstrate them prior to the end of the session.  Jina demonstrated good  understanding of the education provided. See EMR under Patient Instructions for exercises provided during therapy sessions    ASSESSMENT     Jina is a 67 y.o. female referred to outpatient Physical Therapy with a medical diagnosis of lumbar radiculopathy. Pt presents with low back pain, R LE radicular pain, core and hip muscle weakness, muscle tightness.  Patient require verbal cues to pull her toes back during seated calf stretch with Stretch Rite and to correct supine SLR with core activation with proper form, count, and hold times.  Patient progresses through LE and lower trunk stretches with improved ease of movement and knowledge for correct form; however, pt required verbal cues to breath during stretches. PT completed patient goal assessment for last scheduled visit of PT POC. Patient has met 3/4 PT goals at this time. PT and patient agree to DC to HEP at this time.     Pt prognosis is Fair.     Pt will continue to benefit from skilled outpatient physical therapy to address the deficits listed in the problem list box on initial evaluation, provide pt/family education and to maximize pt's level of independence in the home and community environment.     Pt's spiritual, cultural and educational needs considered and pt agreeable to plan of care and goals.     Anticipated barriers to physical therapy: chronicity of pain, lack of improvements with recent episode of PT treatments.     Goals:   1. Patient will report decrease in pain to 2/10 to improve quality of life-MET  2. Patient will report decrease in radiating occurrences from 50%  to less than or equal to 25% to allow patient the ability to perform activities of daily living -MET   3. Patient will  increase R lower extremity strength to 4+/5 to improve ambulation ability- Not met R LE 4/5  4. Patient will increase B hamstring 90/90 by 10 degrees- MET   PLAN   D/C to HEP.    Fortunato Marx, SPT  Nadja Espino, PT, DPT    5/13/2022

## 2023-01-27 DIAGNOSIS — M16.12 OSTEOARTHRITIS OF LEFT HIP: ICD-10-CM

## 2023-01-27 DIAGNOSIS — M54.16 LUMBAR RADICULOPATHY: Primary | ICD-10-CM

## 2023-01-27 DIAGNOSIS — M48.061 LUMBAR STENOSIS: ICD-10-CM

## 2023-02-09 ENCOUNTER — CLINICAL SUPPORT (OUTPATIENT)
Dept: REHABILITATION | Facility: HOSPITAL | Age: 68
End: 2023-02-09
Payer: MEDICARE

## 2023-02-09 DIAGNOSIS — M54.16 LUMBAR RADICULOPATHY: ICD-10-CM

## 2023-02-09 DIAGNOSIS — M16.12 OSTEOARTHRITIS OF LEFT HIP: ICD-10-CM

## 2023-02-09 DIAGNOSIS — M48.061 LUMBAR STENOSIS: ICD-10-CM

## 2023-02-09 PROCEDURE — 97014 ELECTRIC STIMULATION THERAPY: CPT

## 2023-02-09 PROCEDURE — 97161 PT EVAL LOW COMPLEX 20 MIN: CPT

## 2023-02-09 PROCEDURE — 97110 THERAPEUTIC EXERCISES: CPT

## 2023-02-09 NOTE — PLAN OF CARE
"[facility]   Physical Therapy Initial Evaluation    Name: Jina Owen  Clinic Number: [id number]    Therapy Diagnosis:   Encounter Diagnoses   Name Primary?    Lumbar radiculopathy     Lumbar stenosis     Osteoarthritis of left hip      Physician: David Carpenter MD    Physician Orders: PT Eval and Treat   Medical Diagnosis from Referral: lumbar radiculitis  Evaluation Date: 2/9/2023  Authorization Period Expiration: 2/9/2024  Plan of Care Expiration: 03/10/2023  Visit # / Visits authorized: 1/ 8    Time In: 9:05  Time Out: 10:04  Total Appointment Time (timed & untimed codes): 59 minutes    Precautions: Standard    Subjective   Date of onset: chronic history   History of current condition - Jina reports: chronic history of low back pain. She reports that she had an injection in October 2022 and it decreased her R LE radicular symptoms but it increased her back pain. Patient reports that she has increased pain with prolonged sitting. She denies R LE radicular symptoms at this time.      Medical History:   No past medical history on file.    Surgical History:   Jina Owen  has no past surgical history on file.    Medications:   Jina currently has no medications in their medication list.    Allergies:   Review of patient's allergies indicates:  Not on File     Imaging, MRI studies & X-rays     Prior Therapy: Patient completed PT at this facility in April and May of last year   Social History:  lives with their family  Occupation: retired  Prior Level of Function: independent   Current Level of Function: independent with pain     Pain:  Current 8/10, worst 8/10, best 5/10   Location: bilateral low back    Description: Aching, Tingling, and Hot  Aggravating Factors: sitting   Easing Factors: heating pad and back brace  and pain medication     Pts goals: "stop the pain"     Objective     Posture:  Standing lordosis: Decreased  Sitting lordosis: Decreased  Iliac crest height: equivalent   PSIS height: " "equivalent      Forward bend:WNLs   Back bend: WNLs  Lateral trunk lean: right decreased by 50%  left decreased by 50%   Trunk rotation: right decreased by 50%  left decreased by 50%     MANUAL MUSCLE TEST  Right left   Hip flexion MMT number: 3+/5 MMT strength: 4+/5   Hip abduction MMT strength: 3+/5 MMT strength: 4+/5   Knee extension MMT strength: 4+/5 MMT strength: 4+/5   Knee flexion  MMT strength: 4-/5 MMT strength: 4+/5   Ankle dorsiflexion MMT strength: 5/5 MMT strength: 5/5   Ankle plantar flexion  MMT strength: 5/5 MMT strength: 5/5     ROM/flexibility right left   Hamstring 90/90 (-10) WFL WFL     Special test Right  Left    SLR test < 60 degrees Negative Negative   SLR test > 60 degrees Negative Negative   Sitting slump test Negative Negative   Piriformis test Negative Negative   BRI test Negative Negative   SI forward bend Negative Negative   SI distraction Negative Negative   SI compression Negative Negative       Spinal mobility:  Segment: decreased lumbar,thoracic, and sacral mobility with joint mobilizations.    Palpation: minimal tissue tightness in B QL, glutes, and piriformis muscles & moderate tightness in thoracic and lumbar paraspinals.     TREATMENT   Treatment Time In: 9:20  Treatment Time Out: 10:04  Total Treatment time (time-based codes) separate from Evaluation: 44 minutes    Jina received the treatments listed below:  THERAPEUTIC EXERCISES to develop strength, endurance, flexibility, and core stabilization for 34 minutes including :See flowsheet below     Ther-Ex Reps   Nustep 7 minutes    Wedge 2 minutes    Hamstring Stretch 3 x 30"    Posterior Pelvic Tilts 30 x 3"    Lower Trunk Rotations 5 x 10" each side    Single Knee to Chest Stretch 5 x 10 " each side    Piriformis Stretch 2 x 20" each    Figure 4 Stretch 2 x 20" each      SUPERVISED MODALITIES after being cleared for contraindications:  BIPHASIC ESTIM:  Jina received electrical stimulation for pain to the B thoracic and " "lumbar paraspinals. Pt received burst mode at a rate of 2 pps for 10 minutes. Jian tolerated treatment well without any adverse effects.   hot pack for 10 minutes to B thoracic and lumbar muscles with ESTIM.    Home Exercises and Patient Education Provided        Education provided:   - eval findings, plan of care, and Home exercise program     Written Home Exercises Provided: yes.  Exercises were reviewed and Jina was able to demonstrate them prior to the end of the session.  Jina demonstrated good  understanding of the education provided.     See EMR under Patient Instructions for exercises provided 2/9/2023.    Assessment   Jina is a 68 y.o. female referred to outpatient Physical Therapy with a medical diagnosis of lumbar radiculitis. Pt presents with low back pain, muscle tightness, and LE and core muscle weakness. Patient's impairments have limited her overall activity tolerance.     PT provided patient with visual and verbal cueing for proper form, hold times, and increased core activation with exercises. Patient demonstrated no signs of difficulty with exercises of increased pain. She did not report any pain during there-ex. Patient reported "my back feel so stiff," at end of treatment session.     Pt prognosis is Good.   Pt will benefit from skilled outpatient Physical Therapy to address the deficits stated above and in the chart below, provide pt/family education, and to maximize pt's level of independence.     Plan of care discussed with patient: Yes  Pt's spiritual, cultural and educational needs considered and patient is agreeable to the plan of care and goals as stated below:     Anticipated Barriers for therapy: chronicity of pain, compliance with Home exercise program and treatment.     Goals: (to be met by end of plan of care)  Patient will report decrease in pain to 5/10 to improve quality of life  Patient will report decrease in radiating occurrences from R foot  to R thigh to allow patient the " ability to perform activities of daily living   Patient will increase R lower extremity strength to 4/5 to improve ambulation ability.   4. Patient will increase core strength to 4/5 for improved lumbar stability with ADLs.     Plan   Plan of care Certification: 2/9/2023 to 3/10/2023.    Outpatient Physical Therapy 2 times weekly for 4 weeks to include the following interventions: Electrical Stimulation unattended, Manual Therapy, Moist Heat/ Ice, Patient Education, Therapeutic Activities, and Therapeutic Exercise.     Nadja Espino, PT, DPT

## 2023-02-14 ENCOUNTER — CLINICAL SUPPORT (OUTPATIENT)
Dept: REHABILITATION | Facility: HOSPITAL | Age: 68
End: 2023-02-14
Payer: MEDICARE

## 2023-02-14 DIAGNOSIS — G89.29 CHRONIC RIGHT-SIDED LOW BACK PAIN WITH RIGHT-SIDED SCIATICA: Primary | ICD-10-CM

## 2023-02-14 DIAGNOSIS — M54.41 CHRONIC RIGHT-SIDED LOW BACK PAIN WITH RIGHT-SIDED SCIATICA: Primary | ICD-10-CM

## 2023-02-14 PROCEDURE — 97110 THERAPEUTIC EXERCISES: CPT | Mod: CQ

## 2023-02-14 PROCEDURE — 97014 ELECTRIC STIMULATION THERAPY: CPT | Mod: CQ

## 2023-02-14 NOTE — PROGRESS NOTES
"[facility]   Physical Therapy Treatment Note     Name: Jina Owen  Clinic Number: [id number]    Therapy Diagnosis:        Encounter Diagnoses   Name Primary?    Lumbar radiculopathy      Lumbar stenosis      Osteoarthritis of left hip        Physician: David Carpenter MD     Physician Orders: PT Eval and Treat   Medical Diagnosis from Referral: lumbar radiculitis  Evaluation Date: 2/9/2023  Authorization Period Expiration: 2/9/2024  Plan of Care Expiration: 03/10/2023  Visit # / Visits authorized: 2/8     Time In: 12:10 (late start due to waiting on Nustep)   Time Out: 13:00  Total Appointment Time (timed & untimed codes): 50 minutes      Precautions: Standard  Visit Date: 2/14/2023      PTA Visit #: 1/5       SUBJECTIVE     Pt reports: "I'm hurting just a tad today".       She  was  compliant with home exercise program.  Response to previous treatment: No adverse effects noted   Functional change: Early in Plan of Care     Pain: 3/10  Location: back    OBJECTIVE     Objective Measures updated at progress report unless specified.     Treatment     Jina received the treatments listed below:        THERAPEUTIC EXERCISES to develop strength, endurance, flexibility, and core stabilization.  See flow-sheet below.  Added time on Nustep.  Increased reps of Piriformis stretch and Figure 4 stretch.      Ther-Ex Reps   Nustep 10 minutes    Wedge 2 minutes    Hamstring Stretch 3 x 30"    Posterior Pelvic Tilts 30 x 3"    Lower Trunk Rotations 5 x 10" each side    Single Knee to Chest Stretch 5 x 10 " each side    Piriformis Stretch 3 x 20" each *   Figure 4 Stretch 3 x 20" each *      SUPERVISED MODALITIES after being cleared for contraindications:  BIPHASIC ESTIM:  Jina received electrical stimulation for pain to the B thoracic and lumbar paraspinals. Pt received burst mode at a rate of 2 pps for 12 minutes. Jina tolerated treatment well without any adverse effects.   hot pack for 12  minutes " to B thoracic and lumbar muscles with ESTIM.    Patient Education and Home Exercises     Home Exercises Provided and Patient Education Provided     Education provided:   - Plan of Care, Home exercise program, Delayed onset muscle soreness     Written Home Exercises Provided: Patient instructed to cont prior HEP. Exercises were reviewed and Jina was able to demonstrate them prior to the end of the session.  Jina demonstrated fair  understanding of the education provided. See EMR under Patient Instructions for exercises provided during therapy sessions    ASSESSMENT     Jina is a 68 y.o. female referred to outpatient Physical Therapy with a medical diagnosis of lumbar radiculitis. Pt presents with low back pain, muscle tightness, and LE and core muscle weakness. Patient's impairments have limited her overall activity tolerance.  Increased time and reps of There Ex as tolerated by patient.  Patient requires min cues to progress through exercises with proper alignment, count, and hold times.  Patient does not report increased pain during treatment session, and denies increased pain at end of treatment.  No adverse effects noted or reported.     Pt prognosis is Good.     Pt will continue to benefit from skilled outpatient physical therapy to address the deficits listed in the problem list box on initial evaluation, provide pt/family education and to maximize pt's level of independence in the home and community environment.     Pt's spiritual, cultural and educational needs considered and pt agreeable to plan of care and goals.     Anticipated barriers to physical therapy:  chronicity of pain, compliance with Home exercise program and treatment.        Goals: (to be met by end of plan of care)  Patient will report decrease in pain to 5/10 to improve quality of life  Patient will report decrease in radiating occurrences from R foot  to R thigh to allow patient the ability to perform activities of daily living   Patient  will increase R lower extremity strength to 4/5 to improve ambulation ability.   4. Patient will increase core strength to 4/5 for improved lumbar stability with ADLs.       PLAN     Plan of care Certification: 2/9/2023 to 3/10/2023.  Outpatient Physical Therapy 2 times weekly for 4 weeks to include the following interventions: Electrical Stimulation unattended, Manual Therapy, Moist Heat/ Ice, Patient Education, Therapeutic Activities, and Therapeutic Exercise.     Continue Plan of Care per PT order to progress patient toward rehab goals as tolerated by patient.   Debbi Mclean, PTA 2/14/2023

## 2023-02-16 ENCOUNTER — CLINICAL SUPPORT (OUTPATIENT)
Dept: REHABILITATION | Facility: HOSPITAL | Age: 68
End: 2023-02-16
Payer: MEDICARE

## 2023-02-16 DIAGNOSIS — G89.29 CHRONIC RIGHT-SIDED LOW BACK PAIN WITH RIGHT-SIDED SCIATICA: Primary | ICD-10-CM

## 2023-02-16 DIAGNOSIS — M54.41 CHRONIC RIGHT-SIDED LOW BACK PAIN WITH RIGHT-SIDED SCIATICA: Primary | ICD-10-CM

## 2023-02-16 PROCEDURE — 97014 ELECTRIC STIMULATION THERAPY: CPT | Mod: CQ

## 2023-02-16 PROCEDURE — 97110 THERAPEUTIC EXERCISES: CPT | Mod: CQ

## 2023-02-16 NOTE — PROGRESS NOTES
"[facility]   Physical Therapy Treatment Note     [id number]    Therapy Diagnosis:        Encounter Diagnoses   Name Primary?    Lumbar radiculopathy      Lumbar stenosis      Osteoarthritis of left hip        Physician: David Carpenter MD     Physician Orders: PT Eval and Treat   Medical Diagnosis from Referral: lumbar radiculitis  Evaluation Date: 2/9/2023  Authorization Period Expiration: 2/9/2024  Plan of Care Expiration: 03/10/2023  Visit # / Visits authorized: 3/8     Time In: 10:00   Time Out: 10:54  Total Appointment Time (timed & untimed codes): 54 minutes      Precautions: Standard  Visit Date: 2/16/2023      PTA Visit #: 2/5       SUBJECTIVE     Pt reports: "I'm a little stiff today".       She  was  compliant with home exercise program.  Response to previous treatment: No adverse effects noted   Functional change: Early in Plan of Care     Pain: 3/10  Location: back    OBJECTIVE     Objective Measures updated at progress report unless specified.     Treatment     Jina received the treatments listed below:        THERAPEUTIC EXERCISES to develop strength, endurance, flexibility, and core stabilization.  See flow-sheet below.     Ther-Ex Reps   Nustep 10 minutes    Wedge 2 minutes    Hamstring Stretch 3 x 30"    Posterior Pelvic Tilts 30 x 3"    Lower Trunk Rotations 5 x 10" each side    Single Knee to Chest Stretch 5 x 10 " each side    Piriformis Stretch 3 x 20" each    Figure 4 Stretch 3 x 20" each       SUPERVISED MODALITIES after being cleared for contraindications:  BIPHASIC ESTIM:  Jina received electrical stimulation for pain to the B thoracic and lumbar paraspinals. Pt received burst mode at a rate of 2 pps for 12 minutes. Jina tolerated treatment well without any adverse effects.   hot pack for 12  minutes to B thoracic and lumbar muscles with ESTIM.    Patient Education and Home Exercises     Home Exercises Provided and Patient " Education Provided     Education provided:   - Plan of Care, Home exercise program, Delayed onset muscle soreness     Written Home Exercises Provided: Patient instructed to cont prior HEP. Exercises were reviewed and Jina was able to demonstrate them prior to the end of the session.  Jina demonstrated fair  understanding of the education provided. See EMR under Patient Instructions for exercises provided during therapy sessions    ASSESSMENT   Jina is a 68 y.o. female referred to outpatient Physical Therapy with a medical diagnosis of lumbar radiculitis. Pt presents with low back pain, muscle tightness, and LE and core muscle weakness. Patient's impairments have limited her overall activity tolerance.  LPTA provided pt with proper setup on NuStep with resistance to promote warmup, increase strength, and decrease stiffness prior to tx. Tx intensity not increased secondary to recent increase.  Pt maintains proper form, count, and hold times with minimal cueing.  No adverse effects noted or reported to tx.     Pt prognosis is Good.     Pt will continue to benefit from skilled outpatient physical therapy to address the deficits listed in the problem list box on initial evaluation, provide pt/family education and to maximize pt's level of independence in the home and community environment.     Pt's spiritual, cultural and educational needs considered and pt agreeable to plan of care and goals.     Anticipated barriers to physical therapy:  chronicity of pain, compliance with Home exercise program and treatment.        Goals: (to be met by end of plan of care)  Patient will report decrease in pain to 5/10 to improve quality of life  Patient will report decrease in radiating occurrences from R foot  to R thigh to allow patient the ability to perform activities of daily living   Patient will increase R lower extremity strength to 4/5 to improve ambulation ability.   4. Patient will increase core strength to 4/5 for improved  lumbar stability with ADLs.       PLAN     Plan of care Certification: 2/9/2023 to 3/10/2023.  Outpatient Physical Therapy 2 times weekly for 4 weeks to include the following interventions: Electrical Stimulation unattended, Manual Therapy, Moist Heat/ Ice, Patient Education, Therapeutic Activities, and Therapeutic Exercise.     Continue Plan of Care per PT order to progress patient toward rehab goals as tolerated by patient.   PERLA Monreal  2/16/2023

## 2023-02-21 ENCOUNTER — CLINICAL SUPPORT (OUTPATIENT)
Dept: REHABILITATION | Facility: HOSPITAL | Age: 68
End: 2023-02-21
Payer: MEDICARE

## 2023-02-21 DIAGNOSIS — M54.41 CHRONIC RIGHT-SIDED LOW BACK PAIN WITH RIGHT-SIDED SCIATICA: Primary | ICD-10-CM

## 2023-02-21 DIAGNOSIS — G89.29 CHRONIC RIGHT-SIDED LOW BACK PAIN WITH RIGHT-SIDED SCIATICA: Primary | ICD-10-CM

## 2023-02-21 PROCEDURE — 97110 THERAPEUTIC EXERCISES: CPT | Mod: CQ

## 2023-02-21 PROCEDURE — 97014 ELECTRIC STIMULATION THERAPY: CPT | Mod: CQ

## 2023-02-21 NOTE — PROGRESS NOTES
"[facility]   Physical Therapy Treatment Note     Name: Jina Owen  [facility] Number: [id number]    Therapy Diagnosis:        Encounter Diagnoses   Name Primary?    Lumbar radiculopathy      Lumbar stenosis      Osteoarthritis of left hip        Physician: David Carpenter MD     Physician Orders: PT Eval and Treat   Medical Diagnosis from Referral: lumbar radiculitis  Evaluation Date: 2/9/2023  Authorization Period Expiration: 2/9/2024  Plan of Care Expiration: 03/10/2023  Visit # / Visits authorized: 4/8     Time In: 10:02   Time Out: 10:54  Total Appointment Time (timed & untimed codes): 52 minutes      Precautions: Standard  Visit Date: 2/21/2023      PTA Visit #: 3/5       SUBJECTIVE     Pt reports: "I feel pretty good today, just a little sore".       She  was partially  compliant with home exercise program.  Response to previous treatment: No adverse effects noted   Functional change: Early in Plan of Care     Pain: 4/10  Location: back    OBJECTIVE     Objective Measures updated at progress report unless specified.     Treatment     Jina received the treatments listed below:        THERAPEUTIC EXERCISES to develop strength, endurance, flexibility, and core stabilization.  See flow-sheet below.     Ther-Ex Reps   Nustep 10 minutes    Wedge 2 minutes    Hamstring Stretch 3 x 30"    Posterior Pelvic Tilts 30 x 3"    Lower Trunk Rotations 5 x 10" each side    Single Knee to Chest Stretch 5 x 10 " each side    Piriformis Stretch 3 x 20" each    Figure 4 Stretch 3 x 20" each       SUPERVISED MODALITIES after being cleared for contraindications:  BIPHASIC ESTIM:  Jina received electrical stimulation for pain to the B thoracic and lumbar paraspinals. Pt received burst mode at a rate of 2 pps for 12 minutes. Jina tolerated treatment well without any adverse effects.   hot pack for 12  minutes to B thoracic and lumbar muscles with ESTIM.    Patient Education and Home Exercises     Home " Exercises Provided and Patient Education Provided     Education provided:   - Plan of Care, Home exercise program, Delayed onset muscle soreness     Written Home Exercises Provided: Patient instructed to cont prior HEP. Exercises were reviewed and Jina was able to demonstrate them prior to the end of the session.  Jina demonstrated fair  understanding of the education provided. See EMR under Patient Instructions for exercises provided during therapy sessions    ASSESSMENT   Jina is a 68 y.o. female referred to outpatient Physical Therapy with a medical diagnosis of lumbar radiculitis. Pt presents with low back pain, muscle tightness, and LE and core muscle weakness. Patient's impairments have limited her overall activity tolerance.  LPTA provided pt with proper setup on NuStep with resistance to promote warmup, increase strength, and decrease stiffness prior to tx. Pt displays increased carryover of previous tx session. Pt reports decreased pain 2/10 following tx.  Pt maintains proper form, count, and hold times with minimal cueing.  No adverse effects noted or reported to tx.     Pt prognosis is Good.     Pt will continue to benefit from skilled outpatient physical therapy to address the deficits listed in the problem list box on initial evaluation, provide pt/family education and to maximize pt's level of independence in the home and community environment.     Pt's spiritual, cultural and educational needs considered and pt agreeable to plan of care and goals.     Anticipated barriers to physical therapy:  chronicity of pain, compliance with Home exercise program and treatment.        Goals: (to be met by end of plan of care)  Patient will report decrease in pain to 5/10 to improve quality of life  Patient will report decrease in radiating occurrences from R foot  to R thigh to allow patient the ability to perform activities of daily living   Patient will increase R lower extremity strength to 4/5 to improve  ambulation ability.   4. Patient will increase core strength to 4/5 for improved lumbar stability with ADLs.       PLAN     Plan of care Certification: 2/9/2023 to 3/10/2023.  Outpatient Physical Therapy 2 times weekly for 4 weeks to include the following interventions: Electrical Stimulation unattended, Manual Therapy, Moist Heat/ Ice, Patient Education, Therapeutic Activities, and Therapeutic Exercise.     Continue Plan of Care per PT order to progress patient toward rehab goals as tolerated by patient.   PERLA Monreal  2/21/2023

## 2023-02-23 ENCOUNTER — CLINICAL SUPPORT (OUTPATIENT)
Dept: REHABILITATION | Facility: HOSPITAL | Age: 68
End: 2023-02-23
Payer: MEDICARE

## 2023-02-23 DIAGNOSIS — M54.41 CHRONIC RIGHT-SIDED LOW BACK PAIN WITH RIGHT-SIDED SCIATICA: Primary | ICD-10-CM

## 2023-02-23 DIAGNOSIS — G89.29 CHRONIC RIGHT-SIDED LOW BACK PAIN WITH RIGHT-SIDED SCIATICA: Primary | ICD-10-CM

## 2023-02-23 PROCEDURE — 97014 ELECTRIC STIMULATION THERAPY: CPT

## 2023-02-23 PROCEDURE — 97110 THERAPEUTIC EXERCISES: CPT

## 2023-02-23 NOTE — PROGRESS NOTES
"[facility]   Physical Therapy Treatment Note     Name: Jina Owen  [facility] Number: [id number]    Therapy Diagnosis:        Encounter Diagnoses   Name Primary?    Lumbar radiculopathy      Lumbar stenosis      Osteoarthritis of left hip        Physician: David Carpenter MD     Physician Orders: PT Eval and Treat   Medical Diagnosis from Referral: lumbar radiculitis  Evaluation Date: 2/9/2023  Authorization Period Expiration: 2/9/2024  Plan of Care Expiration: 03/10/2023  Visit # / Visits authorized: 5/8     Time In: 8:58  Time Out: 10:02  Total Appointment Time (timed & untimed codes): 64  minutes      Precautions: Standard  Visit Date: 2/23/2023    PTA Visit #: 0/5     SUBJECTIVE     Pt reports: "I didn't sleep last night. The pain was in my back and down my R leg."     She  was   compliant with home exercise program.  Response to previous treatment: No adverse effects noted   Functional change: Early in Plan of Care     Pain: 4/10  Location: back    OBJECTIVE     Objective Measures updated at progress report unless specified.     Treatment     Jina received the treatments listed below:        THERAPEUTIC EXERCISES to develop strength, endurance, flexibility, and core stabilization.  See flow-sheet below.     Ther-Ex Reps   Rec Bike 10 minutes    Wedge 2 minutes    Hamstring Stretch 3 x 30"    Posterior Pelvic Tilts 30 x 3"    Lower Trunk Rotations with swiss ball 5 x 10" each side    Single Knee to Chest Stretch 5 x 10 " each side    Piriformis Stretch 3 x 20" each    Figure 4 Stretch 3 x 20" each       SUPERVISED MODALITIES after being cleared for contraindications:  BIPHASIC ESTIM:  Jina received electrical stimulation for pain to the B thoracic and lumbar paraspinals. Pt received burst mode at a rate of 2 pps for 12 minutes. Jina tolerated treatment well without any adverse effects.   hot pack for 12  minutes to B thoracic and lumbar muscles with ESTIM.    Patient Education and " "Home Exercises     Home Exercises Provided and Patient Education Provided     Education provided:   - Plan of Care, Home exercise program, Delayed onset muscle soreness     Written Home Exercises Provided: Patient instructed to cont prior HEP. Exercises were reviewed and Jina was able to demonstrate them prior to the end of the session.  Jina demonstrated fair  understanding of the education provided. See EMR under Patient Instructions for exercises provided during therapy sessions    ASSESSMENT   Jina is a 68 y.o. female referred to outpatient Physical Therapy with a medical diagnosis of lumbar radiculitis. Pt presents with low back pain, muscle tightness, and LE and core muscle weakness. Patient's impairments have limited her overall activity tolerance. Patient reports increased low back pain prior to tx. Patient instructed on proper use of rec bike to minimize muscular tightness and soreness prior to tx. Instructed patient on lumbar and piriformis stretches. Progressed pt to swiss ball for lumbar rotation and core strengthening, instructed patient on proper from with moderate verbal and tactile cuing. PT completed palpation assessment and note minimal increased muscle tightness in R lumbosacral muscle. Patient reported "I always feel better when I leave here."  No adverse affects to treatment noted.  Nadja WAGONER DPT was present and supervised the entirety of this treatment.       Pt prognosis is Good.     Pt will continue to benefit from skilled outpatient physical therapy to address the deficits listed in the problem list box on initial evaluation, provide pt/family education and to maximize pt's level of independence in the home and community environment.     Pt's spiritual, cultural and educational needs considered and pt agreeable to plan of care and goals.     Anticipated barriers to physical therapy:  chronicity of pain, compliance with Home exercise program and treatment.        Goals: (to be met by " end of plan of care)  Patient will report decrease in pain to 5/10 to improve quality of life  Patient will report decrease in radiating occurrences from R foot  to R thigh to allow patient the ability to perform activities of daily living   Patient will increase R lower extremity strength to 4/5 to improve ambulation ability.   4. Patient will increase core strength to 4/5 for improved lumbar stability with ADLs.       PLAN     Plan of care Certification: 2/9/2023 to 3/10/2023.  Outpatient Physical Therapy 2 times weekly for 4 weeks to include the following interventions: Electrical Stimulation unattended, Manual Therapy, Moist Heat/ Ice, Patient Education, Therapeutic Activities, and Therapeutic Exercise.     Continue Plan of Care per PT order to progress patient toward rehab goals as tolerated by patient.   Terrence Rangel, MARK Espino, PT, DPT     2/23/2023

## 2023-03-06 ENCOUNTER — CLINICAL SUPPORT (OUTPATIENT)
Dept: REHABILITATION | Facility: HOSPITAL | Age: 68
End: 2023-03-06
Payer: MEDICARE

## 2023-03-06 DIAGNOSIS — M54.41 CHRONIC RIGHT-SIDED LOW BACK PAIN WITH RIGHT-SIDED SCIATICA: Primary | ICD-10-CM

## 2023-03-06 DIAGNOSIS — G89.29 CHRONIC RIGHT-SIDED LOW BACK PAIN WITH RIGHT-SIDED SCIATICA: Primary | ICD-10-CM

## 2023-03-06 PROCEDURE — 97014 ELECTRIC STIMULATION THERAPY: CPT

## 2023-03-06 PROCEDURE — 97110 THERAPEUTIC EXERCISES: CPT

## 2023-03-06 NOTE — PROGRESS NOTES
"[facility]   Physical Therapy Treatment Note     Name: Jina Owen  Clinic Number: [id number]    Therapy Diagnosis:        Encounter Diagnoses   Name Primary?    Lumbar radiculopathy      Lumbar stenosis      Osteoarthritis of left hip        Physician: David Carpenter MD     Physician Orders: PT Eval and Treat   Medical Diagnosis from Referral: lumbar radiculitis  Evaluation Date: 2/9/2023  Authorization Period Expiration: 2/9/2024  Plan of Care Expiration: 03/10/2023  Visit # / Visits authorized: 6/8     Time In: 10:01  Time Out: 11:02  Total Appointment Time (timed & untimed codes): 61 minutes      Precautions: Standard  Visit Date: 3/6/2023    PTA Visit #: 0/5     SUBJECTIVE     Pt reports: "I am hurting a lot today."     She  was   compliant with home exercise program.  Response to previous treatment: No adverse effects noted   Functional change: Early in Plan of Care     Pain: 4/10  Location: back    OBJECTIVE     Objective Measures updated at progress report unless specified.     Treatment     Jina received the treatments listed below:      THERAPEUTIC EXERCISES to develop strength, endurance, flexibility, and core stabilization.  See flow-sheet below.     Ther-Ex Reps   Rec Bike 10 minutes    Wedge 2 minutes    Hamstring Stretch 3 x 30"    Posterior Pelvic Tilts 30 x 3"    Lower Trunk Rotations with swiss ball 5 x 10" each side    Single Knee to Chest Stretch 5 x 10 " each side    Piriformis Stretch 3 x 20" each    Figure 4 Stretch 3 x 20" each       SUPERVISED MODALITIES after being cleared for contraindications:  IFC ESTIM:  Jina received electrical stimulation for pain to the B thoracic and lumbar paraspinals for 12 minutes. Jina tolerated treatment well without any adverse effects.   hot pack for 12  minutes to B thoracic and lumbar muscles with ESTIM.    Patient Education and Home Exercises     Home Exercises Provided and Patient Education Provided     Education provided: "   - Plan of Care, Home exercise program, Delayed onset muscle soreness     Written Home Exercises Provided: Patient instructed to cont prior HEP. Exercises were reviewed and Jina was able to demonstrate them prior to the end of the session.  Jina demonstrated fair  understanding of the education provided. See EMR under Patient Instructions for exercises provided during therapy sessions    ASSESSMENT   Jina is a 68 y.o. female referred to outpatient Physical Therapy with a medical diagnosis of lumbar radiculitis. Pt presents with low back pain, muscle tightness, and LE and core muscle weakness. Patient's impairments have limited her overall activity tolerance. Patient reports increased low back pain prior to treatment. Patient instructed on proper use of rec bike to minimize muscular tightness and soreness prior to treatment. Patient with slow movements and verbal complaints of discomfort with all movements. Patient and PT discussed pain management and possible other options for pain due to chronic pain that is unchanged with treatment. No adverse affects to treatment noted.     Pt prognosis is Good.     Pt will continue to benefit from skilled outpatient physical therapy to address the deficits listed in the problem list box on initial evaluation, provide pt/family education and to maximize pt's level of independence in the home and community environment.     Pt's spiritual, cultural and educational needs considered and pt agreeable to plan of care and goals.     Anticipated barriers to physical therapy:  chronicity of pain, compliance with Home exercise program and treatment.      Goals: (to be met by end of plan of care)  Patient will report decrease in pain to 5/10 to improve quality of life  Patient will report decrease in radiating occurrences from R foot  to R thigh to allow patient the ability to perform activities of daily living   Patient will increase R lower extremity strength to 4/5 to improve ambulation  ability.   4. Patient will increase core strength to 4/5 for improved lumbar stability with ADLs.       PLAN     Plan of care Certification: 2/9/2023 to 3/10/2023.  Outpatient Physical Therapy 2 times weekly for 4 weeks to include the following interventions: Electrical Stimulation unattended, Manual Therapy, Moist Heat/ Ice, Patient Education, Therapeutic Activities, and Therapeutic Exercise.     Continue Plan of Care per PT order to progress patient toward rehab goals as tolerated by patient.     Ofelia Maldonado, PT, DPT     3/6/2023

## 2023-03-07 ENCOUNTER — CLINICAL SUPPORT (OUTPATIENT)
Dept: REHABILITATION | Facility: HOSPITAL | Age: 68
End: 2023-03-07
Payer: MEDICARE

## 2023-03-07 ENCOUNTER — DOCUMENTATION ONLY (OUTPATIENT)
Dept: REHABILITATION | Facility: HOSPITAL | Age: 68
End: 2023-03-07
Payer: MEDICARE

## 2023-03-07 DIAGNOSIS — M54.41 CHRONIC RIGHT-SIDED LOW BACK PAIN WITH RIGHT-SIDED SCIATICA: Primary | ICD-10-CM

## 2023-03-07 DIAGNOSIS — G89.29 CHRONIC RIGHT-SIDED LOW BACK PAIN WITH RIGHT-SIDED SCIATICA: Primary | ICD-10-CM

## 2023-03-07 PROBLEM — M54.50 LOW BACK PAIN: Status: RESOLVED | Noted: 2021-09-14 | Resolved: 2023-03-07

## 2023-03-07 PROCEDURE — 97014 ELECTRIC STIMULATION THERAPY: CPT | Mod: CQ

## 2023-03-07 PROCEDURE — 97110 THERAPEUTIC EXERCISES: CPT | Mod: CQ

## 2023-03-07 NOTE — PROGRESS NOTES
Outpatient Therapy Discharge Summary     Name: Jina [facility] Number: [id number]     Therapy Diagnosis:           Encounter Diagnoses   Name Primary?    Lumbar radiculopathy      Lumbar stenosis      Osteoarthritis of left hip        Physician: David Carpenter MD     Physician Orders: PT Eval and Treat   Medical Diagnosis from Referral: lumbar radiculitis  Evaluation Date: 2/9/2023    Date of Last visit: 3/7/2023    Total Visits Received: 7  Cancelled Visits: 1  No Show Visits: 0    Assessment    Goals:  Patient will report decrease in pain to 5/10 to improve quality of life- MET   Patient will report decrease in radiating occurrences from R foot  to R thigh to allow patient the ability to perform activities of daily living -NOT MET; occasional reports of radiating symptoms  Patient will increase R lower extremity strength to 4/5 to improve ambulation ability. -MET   4. Patient will increase core strength to 4/5 for improved lumbar stability with ADLs. - Not met- Unable to progress core strengthening due to continued episodes of increased pain and no lasting reduction in pain with PT interventions.     Discharge reason: Other:  Patient completed PT plan of care. No noted lasting improvements in patient's pain with therapy interventions and decreased ability to progress further due to continued flare-ups of increased pain.     Plan   This patient is discharged from Physical Therapy.     Nadja Espino, PT, DPT

## 2023-03-07 NOTE — PROGRESS NOTES
"[facility]   Physical Therapy Treatment Note     Name: Jina Owen  [facility] Number: [id number]    Therapy Diagnosis:        Encounter Diagnoses   Name Primary?    Lumbar radiculopathy      Lumbar stenosis      Osteoarthritis of left hip        Physician: David Carpenter MD     Physician Orders: PT Eval and Treat   Medical Diagnosis from Referral: lumbar radiculitis  Evaluation Date: 2/9/2023  Authorization Period Expiration: 2/9/2024  Plan of Care Expiration: 03/10/2023  Visit # / Visits authorized: 7/8     Time In: 10:00  Time Out: 11:02  Total Appointment Time (timed & untimed codes): 62 minutes      Precautions: Standard  Visit Date: 3/7/2023    PTA Visit #: 1/5     SUBJECTIVE     Pt reports: "I feel better today than I did yesterday."     She  was   compliant with home exercise program.  Response to previous treatment: No adverse effects noted   Functional change: Early in Plan of Care     Pain: 4/10  Location: back    OBJECTIVE     Objective Measures updated at progress report unless specified.     Treatment     Jina received the treatments listed below:      THERAPEUTIC EXERCISES to develop strength, endurance, flexibility, and core stabilization.  See flow-sheet below.     Ther-Ex Reps   NuStep 10 minutes    Wedge 2 minutes    Hamstring Stretch 3 x 30"    Posterior Pelvic Tilts 30 x 3"    Lower Trunk Rotations with swiss ball 5 x 10" each side    Single Knee to Chest Stretch 5 x 10 " each side    Piriformis Stretch 3 x 20" each    Figure 4 Stretch 3 x 20" each       SUPERVISED MODALITIES after being cleared for contraindications:  BIPHASIC ESTIM:  Jina received electrical stimulation for pain to the B thoracic and lumbar paraspinals for 12 minutes. Jina tolerated treatment well without any adverse effects.   hot pack for 12  minutes to B thoracic and lumbar muscles with ESTIM.    Patient Education and Home Exercises     Home Exercises Provided and Patient Education Provided "     Education provided:   - Plan of Care, Home exercise program, Delayed onset muscle soreness     Written Home Exercises Provided: Patient instructed to cont prior HEP. Exercises were reviewed and Jina was able to demonstrate them prior to the end of the session.  Jina demonstrated fair  understanding of the education provided. See EMR under Patient Instructions for exercises provided during therapy sessions    ASSESSMENT   Jina is a 68 y.o. female referred to outpatient Physical Therapy with a medical diagnosis of lumbar radiculitis. Pt presents with low back pain, muscle tightness, and LE and core muscle weakness. Patient's impairments have limited her overall activity tolerance.  Pt reports decreased back pain prior to tx session. Pt tx intensity not increased secondary to discharge. Pt c/o less discomfort with all movements. No adverse affects noted or reported.     Pt prognosis is Good.     Pt will continue to benefit from skilled outpatient physical therapy to address the deficits listed in the problem list box on initial evaluation, provide pt/family education and to maximize pt's level of independence in the home and community environment.     Pt's spiritual, cultural and educational needs considered and pt agreeable to plan of care and goals.     Anticipated barriers to physical therapy:  chronicity of pain, compliance with Home exercise program and treatment.      Goals: (to be met by end of plan of care)  Patient will report decrease in pain to 5/10 to improve quality of life  Patient will report decrease in radiating occurrences from R foot  to R thigh to allow patient the ability to perform activities of daily living   Patient will increase R lower extremity strength to 4/5 to improve ambulation ability.   4. Patient will increase core strength to 4/5 for improved lumbar stability with ADLs.       PLAN     Plan of care Certification: 2/9/2023 to 3/10/2023.  Outpatient Physical Therapy 2 times weekly  for 4 weeks to include the following interventions: Electrical Stimulation unattended, Manual Therapy, Moist Heat/ Ice, Patient Education, Therapeutic Activities, and Therapeutic Exercise.     Plan to discharge per PT order.  PERLA Monreal  3/7/2023

## 2024-05-28 NOTE — PROGRESS NOTES
"[facility]   Physical Therapy Treatment Note     Name: Jina Owen  [facility] Number: [id number]      Visit Date: 4/4/2022  Therapy Diagnosis:        Encounter Diagnoses   Name Primary?    Lumbar radiculopathy      Primary osteoarthritis of left hip      Spinal stenosis      Chronic right-sided low back pain with right-sided sciatica Yes      Physician: David Carpenter MD     Physician Orders: PT Eval and Treat   Medical Diagnosis from Referral: lumbar radiculopathy   Evaluation Date: 4/1/2022  Authorization Period Expiration: 4/1/2023  Plan of Care Expiration: 5/13/2022  Visit # / Visits authorized: 2/12     Time In: 15:00   Time Out: 15:55  Total Appointment Time (timed & untimed codes):  55 minutes   Precautions: Standard      PTA Visit #: 1/5    SUBJECTIVE     Pt reports: "I could hardly sleep last night.  It's like my foot (R) was cramping, and I was hurting down my leg".       She was partially compliant with home exercise program.  Response to previous treatment: No adverse effects reported  Functional change: Early in POC     Pain: 7/10  Location:  Low back, Right hip radiating down into Right LE     OBJECTIVE     Objective Measures updated at progress report unless specified.     Treatment     Jina received the treatments listed below:      THERAPEUTIC EXERCISES to develop strength, flexibility, posture and core stabilization.  See flow-sheet below:   Increased reps with previously instructed exercises as tolerated by patient.  Added Figure 4 stretch.       Ther-Ex REPS   Nustep 10 minutes *   Wedge 2 minutes *   Hamstring Stretch 3 x 30"    Posterior Pelvic Tilt  30 x 3"    Lower Trunk Rotations 5 x 10" each side    Single Knee to Chest Stretch 5 x 10" each side    Piriformis Stretch  3 x 30" each side*    Figure Four Stretch  3 x 30" Each side *      MANUAL THERAPY TECHNIQUES including active neuromuscular releases and muscle energy technique  to correct R side pelvic " Detail Level: Detailed "anterior rotation  and decrease muscle tightness in R lumbar muscles .     SUPERVISED MODALITIES after being cleared for contraindications:  TENS:  Jina received Biphasic electrical stimulation for pain to the B lumbo sacral muscles. Pt received burst mode at a rate of 2 pps for 12 minutes. Jina tolerated treatment well without any adverse effects.      HOT PACK  for 10 minutes to B lumbosacral muscles.        Patient Education and Home Exercises     Home Exercises Provided and Patient Education Provided     Education provided: ELIESERS       Written Home Exercises Provided: Patient instructed to cont prior HEP. Exercises were reviewed and Jina was able to demonstrate them prior to the end of the session.  Jina demonstrated good  understanding of the education provided. See EMR under Patient Instructions for exercises provided during therapy sessions    ASSESSMENT     Jina is a 67 y.o. female referred to outpatient Physical Therapy with a medical diagnosis of lumbar radiculopathy. Pt presents with low back pain, R LE radicular pain, core and hip muscle weakness, muscle tightness.   Patient requires mod cues from LPTA to complete Ther Ex with proper alignment, hold times, and speed of movement.  Patient does not report increased pain during tx, but does state she feels "tightness" with stretches.  Patient rates low back, Right hip, and Right LE pain decreased to 5/10 at end of physical therapy treatment session.  No adverse effects noted.       Pt prognosis is Fair.     Pt will continue to benefit from skilled outpatient physical therapy to address the deficits listed in the problem list box on initial evaluation, provide pt/family education and to maximize pt's level of independence in the home and community environment.     Pt's spiritual, cultural and educational needs considered and pt agreeable to plan of care and goals.     Anticipated barriers to physical therapy: chronicity of pain, lack of improvements with " recent episode of PT treatments.     Goals:   1. Patient will report decrease in pain to 2/10 to improve quality of life  2. Patient will report decrease in radiating occurrences from 50%  to less than or equal to 25% to allow patient the ability to perform activities of daily living   3. Patient will increase R lower extremity strength to 4+/5 to improve ambulation ability  4. Patient will increase B hamstring 90/90 by 10 degrees      PLAN     Continue POC Per PT order to progress patient toward rehab goals as tolerated by patient.      Debbi Mclean, PTA 4/4/2022

## 2025-01-21 DIAGNOSIS — M79.18 MYALGIA, OTHER SITE: ICD-10-CM

## 2025-01-21 DIAGNOSIS — M54.16 LUMBAR RADICULOPATHY: Primary | ICD-10-CM

## 2025-01-28 ENCOUNTER — CLINICAL SUPPORT (OUTPATIENT)
Dept: REHABILITATION | Facility: HOSPITAL | Age: 70
End: 2025-01-28
Payer: MEDICARE

## 2025-01-28 DIAGNOSIS — M54.16 LUMBAR RADICULOPATHY: ICD-10-CM

## 2025-01-28 DIAGNOSIS — M79.18 MYALGIA, OTHER SITE: ICD-10-CM

## 2025-01-28 PROCEDURE — 97161 PT EVAL LOW COMPLEX 20 MIN: CPT

## 2025-01-28 NOTE — LETTER
January 28, 2025  David Carpenter MD  [address] [address]    Dear Jina Owen,    The attached plan of care is being sent to you for review and reference.    You may indicate your approval by signing the document electronically, or by faxing/mailing a signed copy of the final page of this document back to the attention of Nadja Espino, PT:         Plan of Care 1/28/25   Effective from: 1/28/2025  Effective to: 3/11/2025    Plan ID: [id number]            Participants as of Finalize on 1/28/2025    Name Type Comments Contact Info    David Carpenter MD Referring Provider  [phone]    Nadja Espino, PT Physical Therapist         Last Plan Note     No notes of the expected types and statuses found.        Current Participants as of 1/28/2025    Name Type Comments Contact Info    David Carpenter MD Referring Provider  [phone]    Signature pending    Nadja Espino, PT Physical Therapist                  Sincerely,      Nadja Espino, PT  [facility]                                                            Dear Nadja Espino, PT,    RE: Ms. Jina Owen, MRN: [id number]    I certify that I have reviewed the attached plan of care and agree to the details within.        ___________________________  ___________________________  Patient Printed Name    Patient Signed Name      ___________________________  Date and Time

## 2025-01-28 NOTE — PROGRESS NOTES
Outpatient Rehab    Physical Therapy Evaluation    Patient Name: Jina Owen  MRN: [id number]  YOB: 1955  Today's Date: 1/28/2025    Therapy Diagnosis:   Encounter Diagnoses   Name Primary?    Lumbar radiculopathy     Myalgia, other site      Physician: David Carpenter MD    Physician Orders: Eval and Treat  Medical Diagnosis: lumbar radiculopathy and myalgia     Visit # / Visits Authorized:  1 / 12   Date of Evaluation:  1/28/2025   Insurance Authorization Period: 1/21/2025 to 1/21/2026  Plan of Care Certification:  1/28/2025 to 3/11/2025      Time In:   10:18  Time Out:  10:37  Total Time:   19 minutes   Total Billable Time: 19 minutes          Subjective   History of Present Illness  Jina is a 70 y.o. female who reports to physical therapy with a chief concern of R hip and low back pain. According to the patient's chart, Jina has no past medical history on file. Jina has no past surgical history on file.    The patient reports a medical diagnosis of lumbar radiculopathy and myalgia. The patient has experienced this issue since 01/13/25.   Diagnostic tests related to this condition: None.        History of Present Condition/Illness: Patient reports chronic history of R side low back pain that occasionally flares up. She has completed PT at this facility in the past for R lumbar pain and had improved symptoms. Patient states that she started experiencing increasing R side low back and R hip pain in November of last year. She was having symptoms down her R leg and into the top of her R foot. Patient reports that her pain was making it difficult to get up and down from a chair, turning over in bed, and it was interfering with her sleep. Patient received an injection in her R hip on 1/13/2025 that has helped decrease her pain and she is now able to sleep better.     Pain     Patient reports a current pain level of 0/10. Pain at best is reported as 0/10. Pain at worst is reported as 8/10.   Location:  R low back and hip  Clinical Progression (since onset): Stable  Pain Qualities: Burning, Cramping, Sharp, Tightness, Throbbing  Pain-Relieving Factors: Activity modification, Medications - prescription, Rest, Other (Comment)  Other Pain-Relieving Factors: injection  Pain-Aggravating Factors: Lying down, Movement, Sleeping, Transfers         Living Arrangements  Living Situation  Housing: Home independently  Living Arrangements: Spouse/significant other  Support Systems: Family members, Spouse/significant other    Home Setup  Type of Structure: House  Number of Levels in Home: One level        Employment  Employment Status: Retired          Past Medical History/Physical Systems Review:   Jina Owen  has no past medical history on file.    Jina Owen  has no past surgical history on file.    Jina currently has no medications in their medication list.    Review of patient's allergies indicates:  Not on File     Objective   Posture    Increased lumbar lordosis is observed.     Pelvic tilt observed: Anterior  Right pelvis characteristics: Posterior Superior Iliac Spine Higher and Ilium Higher         Bilaterally, knee position is: Genu Valgus       Lower Extremity Sensation  General Lumbar/Lower Extremity Sensation  Intact: Right and Left  Right Lumbar/Lower Extremity Sensation  Intact: Light Touch, Sharp/Dull Discrimination, Static Two Point Discrimination, Dynamic Two Point Discrimination, Kinesthesia, and Proprioception  Right Lumbar/Lower Extremity Sensation Stocking Glove Pattern: No    Left Lumbar/Lower Extremity Sensation  Intact: Light Touch, Static Two Point Discrimination, Dynamic Two Point Discrimination, Sharp/Dull Discrimination, Kinesthesia, and Proprioception  Left Lumbar/Lower Extremity Sensation Stocking Glove Pattern: No             Spinal Mobility  Hypermobile: Lumbosacral  Hypomobile: Thoracic       Spinal Muscle Palpation  Right Spinal Muscle Palpation  Abnormal: Lumbar/Sacral  Right  Lumbar/Sacral Muscle Palpation Observations: Increased tissue tightness in R quadratus lumborum, R lumbar and thoracic paraspinal muscle tightness               Hip Palpation  Right Hip Palpation  Abnormal: Lumbar/Sacral Muscle  Right Lumbar/Sacral Muscle Palpation Observations: Increased tissue tightness in R quadratus lumborum, R lumbar and thoracic paraspinal muscle tightness                    Lumbar Range of Motion   Active (deg) Passive (deg) Pain   Flexion         Extension     Yes   Right Lateral Flexion     Yes   Right Rotation     Yes   Left Lateral Flexion         Left Rotation                       Lumbar Strength   Strength Pain   Flexion 4-     Extension 4-     Right Lateral Flexion       Left Lateral Flexion       Right Rotation       Left Rotation                    Hip Strength - Planes of Motion   Right Strength Right Pain Left Strength Left  Pain   Flexion (L2) 3+   4     Extension 3+   4-     ABduction 4-   4     ADduction 4   4     Internal Rotation 3+   4     External Rotation 3+   4             Lumbar/Pelvic Girdle Special Tests  Lumbar Tests - Repeated  Positive: Extension  increased pain    Lumbar Tests - SLR and Tension  Negative: Right Passive Straight Leg Raise, Left Passive Straight Leg Raise, Right Crossed Straight Leg Raise, and Left Crossed Straight Leg Raise                 Pelvic Girdle / Sacrum Tests  Positive: Right BRI, Left BRI, Right FADIR, Left FADIR, Right Gaenslen's, and Right Sacroiliac Compression  Negative: Left Gaenslen's and Left Sacroiliac Compression  Positive: Right Thigh Thrust/Posterior Shear  Negative: Left Thigh Thrust/Posterior Shear         Hip Special Tests  Intra-Articular/Impingement Tests  Positive: Right BRI, Left BRI, Right FADIR, and Left FADIR  Sacroiliac Joint Tests  Positive: Right Gaenslen's, Right Compression, and Right Thigh Thrust/Posterior Shear  Negative: Left Gaenslen's, Left Compression, and Left Thigh Thrust/Posterior Shear  David  "Test  Positive: Right and Left     90/90 Hamstring Test  Negative: Right and Left     Straight Leg Raise Test  Negative: Right and Left            Knee Special Tests                 Ambulation Assistance Required  Surface With  Assistive Device Without Assistive Device Details   Level Independent Independent      Uneven Independent Independent     Curb Independent Independent       Stairs Assistance Required   Assistance Level Upper Extremity Support Pattern   Ascending Independent Without rails     Descending Independent Without rails          Gait Analysis  Base of Support: Normal  Gait Pattern: Antalgic  Walking Speed: Decreased    Right Side Walking Observations  Decreased: Stance Time       Left Side Walking Observations  Decreased: Swing Time and Step Length     Hip Observations During Gait  Right: Decreased Hip Extension and Hip Trendelenburg         Intake Outcome Measure for FOTO Survey    Therapist reviewed FOTO scores for Jina Owen on 1/28/2025.   FOTO report - see Media section or FOTO account episode details.     Intake Score:  %    Treatment:  No PT treatment completed today pending insurance approval of PT plan of care.     Patient's spiritual, cultural, and educational needs considered and patient agreeable to plan of care and goals.     Assessment & Plan   Assessment  Jina    Presentation of Symptoms: Stable  Will Comorbidities Impact Care: No       Functional Limitations: Activity tolerance, Bed mobility, Decreased ambulation distance/endurance, Functional mobility, Gait limitations, Participating in leisure activities, Transfers, Standing tolerance, Sitting tolerance  Impairments: Abnormal gait, Activity intolerance, Abnormal or restricted range of motion, Impaired physical strength, Pain with functional activity    Patient Goal for Therapy (PT): "decrease pain"  Prognosis: Good  Assessment Details: Patient's clinical presentation and eval findings are consistent with lumbar radiculopathy and " R hip trochanteric bursitis. Patient's symptoms are currently limiting her mobility, activity tolerance, and interfering with her sleep.     Plan  From a physical therapy perspective, the patient would benefit from: Skilled Rehab Services    Planned therapy interventions include: Therapeutic exercise, Therapeutic activities, Neuromuscular re-education, and Manual therapy.    Planned modalities to include: Cryotherapy (cold pack), Thermotherapy (hot pack), Electrical stimulation - passive/unattended, and Ultrasound.        Visit Frequency: 2 times Per Week for 6 Weeks.       This plan was discussed with Patient.   Discussion participants: Agreed Upon Plan of Care             Goals:   Active       Changing body position       Patient will report no pain with rolling over in bed for improved quality of sleep.        Start:  01/28/25    Expected End:  03/11/25       STG             Functional outcome       Patient will have increased FOTO score by greater than or equal to 8 points to demonstrate subjective improvement.        Start:  01/28/25    Expected End:  03/11/25       LTG          Patient will demonstrate independence in home program for support of progression       Start:  01/28/25    Expected End:  03/11/25       STG            Pain       Patient will report pain of 5/10 demonstrating a reduction of overall pain.        Start:  01/28/25    Expected End:  03/11/25       STG         Patient will report pain of 3/10 demonstrating a reduction of overall pain.        Start:  01/28/25    Expected End:  03/11/25       LTG             Range of Motion       Patient will have pain free lumbar extension ROM for increased ability to transfer sit to stand without pain.        Start:  01/28/25    Expected End:  03/11/25       LTG             Strength       Patient will achieve bilateral hip flexion strength of 4+/5 for improved gait mechanics.        Start:  01/28/25    Expected End:  03/11/25       LTG              Bonetia  Kenzie, PT, DPT     Physician Signature : ____________________________________________________  Date:_______________________________________________

## 2025-01-31 ENCOUNTER — CLINICAL SUPPORT (OUTPATIENT)
Dept: REHABILITATION | Facility: HOSPITAL | Age: 70
End: 2025-01-31
Payer: MEDICARE

## 2025-01-31 DIAGNOSIS — M54.16 LUMBAR RADICULOPATHY: Primary | ICD-10-CM

## 2025-01-31 PROCEDURE — 97110 THERAPEUTIC EXERCISES: CPT | Mod: CQ

## 2025-01-31 PROCEDURE — 97014 ELECTRIC STIMULATION THERAPY: CPT | Mod: CQ

## 2025-01-31 NOTE — PROGRESS NOTES
"  Outpatient Rehab    Physical Therapy Visit    Patient Name: Jina Owen  MRN: [id number]  YOB: 1955  Today's Date: 1/28/2025     Therapy Diagnosis:        Encounter Diagnoses   Name Primary?    Lumbar radiculopathy      Myalgia, other site        Physician: David Carpenter MD     Physician Orders: Eval and Treat  Medical Diagnosis: lumbar radiculopathy and myalgia      Visit # / Visits Authorized:  2 / 12   Date of Evaluation:  1/28/2025   Insurance Authorization Period: 1/21/2025 to 1/21/2026  Plan of Care Certification:  1/28/2025 to 3/11/2025                 Time In:  8:45  Time Out:  9:27  Total Time:   42 minutes   Total Billable Time: 42 minutes          Subjective   "I am hurting today.  I think it's from all the testing we did in here the other day"..  Pain reported as 7. Right lateral thigh, hip down to knee    Objective            Treatment:  Therapeutic Exercise  Therapeutic Exercise Activity 1: Nustep x 8 minutes  Therapeutic Exercise Activity 2: Seated Hamstring Stretch 3 x 20"  Therapeutic Exercise Activity 3: Supine posterior pelvic tilt 20 x 3"  Therapeutic Exercise Activity 4: Supine Lower trunk rotations 20 x 3"  Therapeutic Exercise Activity 5: Supine single knee to chest 5 x 10"  Therapeutic Exercise Activity 6: Supine Piriformis Stretch 2 x 20"  Therapeutic Exercise Activity 7: Supine Figure 4 stretch 2 x 20"        supervised modalities after being cleared for contradictions: TENS:  Jina received TENS electrical stimulation for pain to the Right lateral hip and IT Band and Right Quadratus Lumborum and Right Piriformis. Pt received burst mode at a rate of 2 pps for 12 minutes. Jina tolerated treatment well without any adverse effects.     hot pack with IFC .           Patient's spiritual, cultural, and educational needs considered and patient agreeable to plan of care and goals.     Assessment & Plan   Assessment: Patient required mod verbal and visual cues and min tactile " cues to progress through completion of Therapeutic exercises as instructed with proper form and alignment.  Patient denies reports of increased pain at end of PT treatment session.           Plan:    Planned therapy interventions include: Therapeutic exercise, Therapeutic activities, Neuromuscular re-education, and Manual therapy.    Planned modalities to include: Cryotherapy (cold pack), Thermotherapy (hot pack), Electrical stimulation - passive/unattended, and Ultrasound.         Visit Frequency: 2 times Per Week for 6 Weeks.          Continue POC per PT order to progress patient toward rehab goals as tolerated by patient. Debbi Mclean LPTA    Goals:   Active       Changing body position       Patient will report no pain with rolling over in bed for improved quality of sleep.        Start:  01/28/25    Expected End:  03/11/25       STG             Functional outcome       Patient will have increased FOTO score by greater than or equal to 8 points to demonstrate subjective improvement.        Start:  01/28/25    Expected End:  03/11/25       LTG          Patient will demonstrate independence in home program for support of progression       Start:  01/28/25    Expected End:  03/11/25       STG            Pain       Patient will report pain of 5/10 demonstrating a reduction of overall pain.        Start:  01/28/25    Expected End:  03/11/25       STG         Patient will report pain of 3/10 demonstrating a reduction of overall pain.        Start:  01/28/25    Expected End:  03/11/25       LTG             Range of Motion       Patient will have pain free lumbar extension ROM for increased ability to transfer sit to stand without pain.        Start:  01/28/25    Expected End:  03/11/25       LTG             Strength       Patient will achieve bilateral hip flexion strength of 4+/5 for improved gait mechanics.        Start:  01/28/25    Expected End:  03/11/25       LTG

## 2025-02-03 ENCOUNTER — CLINICAL SUPPORT (OUTPATIENT)
Dept: REHABILITATION | Facility: HOSPITAL | Age: 70
End: 2025-02-03
Payer: MEDICARE

## 2025-02-03 DIAGNOSIS — M54.16 LUMBAR RADICULOPATHY: Primary | ICD-10-CM

## 2025-02-03 PROCEDURE — 97110 THERAPEUTIC EXERCISES: CPT | Mod: CQ

## 2025-02-03 PROCEDURE — 97010 HOT OR COLD PACKS THERAPY: CPT | Mod: CQ

## 2025-02-03 PROCEDURE — 97014 ELECTRIC STIMULATION THERAPY: CPT | Mod: CQ

## 2025-02-03 NOTE — PROGRESS NOTES
"  Outpatient Rehab    Physical Therapy Visit    Patient Name: Jina Owen  MRN: [id number]  YOB: 1955  Today's Date: 2/3/2025    Therapy Diagnosis:   Encounter Diagnosis   Name Primary?    Lumbar radiculopathy Yes     Physician: David Carpenter MD    Physician Orders: Eval and Treat  Medical Diagnosis: lumbar radiculopathy and myalgia     Visit # / Visits Authorized:  3 / 12   Date of Evaluation:  1/28/2025   Insurance Authorization Period: 1/21/2025 to 1/21/2026  Plan of Care Certification:  1/28/2025 to 3/11/2025  PTA visit #: 2  Foto due visit #: 6     Time In: 0930   Time Out: 1020  Total Time: 50 minutes   Total Billable Time: 50 minutes      Received Plan of Care per Landy Espino, PT     Subjective   pain in R hip today as noted; taking OTC pain meds on arrival; states she hurt over the weekend.  Pain reported as 5.      Objective            Treatment:    Therapeutic Exercise x 35 minutes   Therapeutic Exercise Activity 1: Nustep x 5 minutes  Therapeutic Exercise Activity 2: Seated Hamstring Stretch 3 x 20", BLE  Therapeutic Exercise Activity 3: Supine posterior pelvic tilt 20 x 3"  Therapeutic Exercise Activity 4: Supine Lower trunk rotations 20 x 3" (not today)  Therapeutic Exercise Activity 5: Supine single knee to chest 3x20 s/h, BLE  Therapeutic Exercise Activity 6: Supine Piriformis Stretch 3x20 s/h, BLE  Therapeutic Exercise Activity 7: Supine Figure 4 stretch 3x20 s/h, BLE  Therapeutic Exercise Activity 8: supine hamstring stretch, BLE, 3x20 s/h w/ neural glide  Therapeutic Exercise Activity 9: bridging x 20 reps  Therapeutic Exercise Activity 10: hooklying hip abd w/ blue band x 20 reps              Modalities x 15 minutes   Moist Heat (min): 10 min to LB and R iliotibial band during estim   Electrical Stimulation (Parameters): Premod to R SIJ and R ITB in supine x 10 min    Assessment & Plan     Assessment: Pt reported 50% less pain at treatment end; will most likely benefit from R " ITB focus next session: stretching and possibly theraroller  Evaluation/Treatment Tolerance: Patient tolerated treatment well    Education  Education was done with Patient. The patient's learning style includes Demonstration. The patient Demonstrates understanding and Verbalizes understanding.         Issued blue band for use with home exercise program        Plan: Continue per POC and progress as pt able  Plan of Care Certification:  1/28/2025 to 3/11/2025  Planned therapy interventions include: Therapeutic exercise, Therapeutic activities, Neuromuscular re-education, and Manual therapy.    Planned modalities to include: Cryotherapy (cold pack), Thermotherapy (hot pack), Electrical stimulation - passive/unattended, and Ultrasound.      Visit Frequency: 2 times Per Week for 6 Weeks.    Goals:   Active       Changing body position       Patient will report no pain with rolling over in bed for improved quality of sleep.        Start:  01/28/25    Expected End:  03/11/25       STG             Functional outcome       Patient will have increased FOTO score by greater than or equal to 8 points to demonstrate subjective improvement.        Start:  01/28/25    Expected End:  03/11/25       LTG          Patient will demonstrate independence in home program for support of progression       Start:  01/28/25    Expected End:  03/11/25       STG            Pain       Patient will report pain of 5/10 demonstrating a reduction of overall pain.        Start:  01/28/25    Expected End:  03/11/25       STG         Patient will report pain of 3/10 demonstrating a reduction of overall pain.        Start:  01/28/25    Expected End:  03/11/25       LTG             Range of Motion       Patient will have pain free lumbar extension ROM for increased ability to transfer sit to stand without pain.        Start:  01/28/25    Expected End:  03/11/25       LTG             Strength       Patient will achieve bilateral hip flexion strength of  4+/5 for improved gait mechanics.        Start:  01/28/25    Expected End:  03/11/25

## 2025-02-05 ENCOUNTER — CLINICAL SUPPORT (OUTPATIENT)
Dept: REHABILITATION | Facility: HOSPITAL | Age: 70
End: 2025-02-05
Payer: MEDICARE

## 2025-02-05 DIAGNOSIS — M54.16 LUMBAR RADICULOPATHY: Primary | ICD-10-CM

## 2025-02-05 PROCEDURE — 97110 THERAPEUTIC EXERCISES: CPT | Mod: CQ

## 2025-02-05 PROCEDURE — 97014 ELECTRIC STIMULATION THERAPY: CPT | Mod: CQ

## 2025-02-05 NOTE — PROGRESS NOTES
"  Outpatient Rehab    Physical Therapy Visit    Patient Name: Jina Owen  MRN: [id number]  YOB: 1955  Today's Date: 2/5/2025    Therapy Diagnosis:   No diagnosis found.    Physician: David Carpenter MD    Physician Orders: Eval and Treat  Medical Diagnosis: lumbar radiculopathy and myalgia     Visit # / Visits Authorized:  4 / 12   Date of Evaluation:  1/28/2025   Insurance Authorization Period: 1/21/2025 to 1/21/2026  Plan of Care Certification:  1/28/2025 to 3/11/2025  PTA visit #: 3  Foto due visit #: 6     Time In:   8:45  Time Out:  9:40  Total Time:    55 minutes   Total Billable Time: 55 minutes      Received Plan of Care per Landy Espino, PT     Subjective   Patient reports she did housework including mopping and sweeping yesterday, and her back and hips are stiff and sore today.  Patient rates low back and hip pain 5/10 upon arrival to PT treatment session.             Objective            Treatment:    Therapeutic Exercise x 43 minutes   Therapeutic Exercise Activity 1: Nustep x 5 minutes  Therapeutic Exercise Activity 2: Seated Hamstring Stretch 3 x 20", BLE  Therapeutic Exercise Activity 3: Supine posterior pelvic tilt 20 x 3"  Therapeutic Exercise Activity 4: Supine Lower trunk rotations 20 x 3"   Therapeutic Exercise Activity 5: Supine single knee to chest 3x20 s/h, BLE  Therapeutic Exercise Activity 6: Supine Piriformis Stretch 3x20 s/h, BLE  Therapeutic Exercise Activity 7: Supine Figure 4 stretch 3x20 s/h, BLE  Therapeutic Exercise Activity 8: supine hamstring stretch, BLE, 3x20 s/h w/ neural glide  Therapeutic Exercise Activity 9: bridging x 20 reps  Therapeutic Exercise Activity 10: hooklying hip abd w/ blue band x 20 reps  Therapeutic Exercise Activity 11:  Seated ball roll stretch 10 x 10"             Modalities x 15 minutes   Moist Heat (min): 10 min to LB and R iliotibial band during estim   Electrical Stimulation (Parameters): Premod to Left and Right Quadratus Lumborum and " "Left and R Piriformis with patient  in supine x 10 min    Assessment & Plan     Assessment:  Patient requires increased time to progress through completion of Therapeutic exercises and states "I just do a few extra".  No pelvic mal-alignment noted with palpation, but noted mod tightness in patient's back extensors and lumbar paraspinals.  Patient instructed to add seated ball rolls to Home exercise program.  Patient states low back, hip, and LE pain level not changed at end of PT treatment session.             Plan:    Plan of Care Certification:  1/28/2025 to 3/11/2025  Planned therapy interventions include: Therapeutic exercise, Therapeutic activities, Neuromuscular re-education, and Manual therapy.    Planned modalities to include: Cryotherapy (cold pack), Thermotherapy (hot pack), Electrical stimulation - passive/unattended, and Ultrasound.      Visit Frequency: 2 times Per Week for 6 Weeks.    Goals:   Active       Changing body position       Patient will report no pain with rolling over in bed for improved quality of sleep.        Start:  01/28/25    Expected End:  03/11/25       STG             Functional outcome       Patient will have increased FOTO score by greater than or equal to 8 points to demonstrate subjective improvement.        Start:  01/28/25    Expected End:  03/11/25       LTG          Patient will demonstrate independence in home program for support of progression       Start:  01/28/25    Expected End:  03/11/25       STG            Pain       Patient will report pain of 5/10 demonstrating a reduction of overall pain.        Start:  01/28/25    Expected End:  03/11/25       STG         Patient will report pain of 3/10 demonstrating a reduction of overall pain.        Start:  01/28/25    Expected End:  03/11/25       LTG             Range of Motion       Patient will have pain free lumbar extension ROM for increased ability to transfer sit to stand without pain.        Start:  01/28/25    " Expected End:  03/11/25       LTG             Strength       Patient will achieve bilateral hip flexion strength of 4+/5 for improved gait mechanics.        Start:  01/28/25    Expected End:  03/11/25       LTG            Continue Plan of Care per PT order to progress patient toward rehab goals as tolerated by patient.   PERLA Toney 2/5/2025

## 2025-02-18 ENCOUNTER — CLINICAL SUPPORT (OUTPATIENT)
Dept: REHABILITATION | Facility: HOSPITAL | Age: 70
End: 2025-02-18
Payer: MEDICARE

## 2025-02-18 DIAGNOSIS — M54.16 LUMBAR RADICULOPATHY: Primary | ICD-10-CM

## 2025-02-18 PROCEDURE — 97010 HOT OR COLD PACKS THERAPY: CPT | Mod: CQ

## 2025-02-18 PROCEDURE — 97110 THERAPEUTIC EXERCISES: CPT | Mod: CQ

## 2025-02-18 PROCEDURE — 97530 THERAPEUTIC ACTIVITIES: CPT | Mod: CQ

## 2025-02-18 NOTE — PROGRESS NOTES
"Outpatient Rehab     Physical Therapy Visit     Patient Name: Jina Owen  MRN: [id number]  YOB: 1955  Today's Date: 2/18/2025     Therapy Diagnosis:        Encounter Diagnosis   Name Primary?    Lumbar radiculopathy Yes      Physician: David Carpenter MD     Physician Orders: Eval and Treat  Medical Diagnosis: lumbar radiculopathy and myalgia      Visit # / Visits Authorized:  5 / 12   Date of Evaluation:  1/28/2025   Insurance Authorization Period: 1/21/2025 to 1/21/2026  Plan of Care Certification:  1/28/2025 to 3/11/2025  PTA visit #: 4  Foto due visit #: 6                Time In: 1105   Time Out: 1150  Total Time: 45 minutes   Total Billable Time: 45 minutes        Subjective  "Pretty good" today; 2/10 pain     Objective         Treatment:     Therapeutic Exercise x 35 minutes   Therapeutic Exercise Activity 1: Nustep x 8 minutes  Therapeutic Exercise Activity 2: Standing Hamstring Stretch 3 x 20", BLE  Therapeutic Exercise Activity 3: Supine posterior pelvic tilt 20 x 3"   Therapeutic Exercise Activity 4: Supine Lower trunk rotations 20 x 3"   Therapeutic Exercise Activity 5: Supine single knee to chest 3x20 s/h, BLE  Therapeutic Exercise Activity 6: Supine Piriformis Stretch 3x20 s/h, BLE  Therapeutic Exercise Activity 7: Supine Figure 4 stretch 3x20 s/h, BLE  Therapeutic Exercise Activity 8: supine hamstring stretch, BLE, 3x20 s/h w/ neural glide  Therapeutic Exercise Activity 9: bridging x 20 repetitions (not today)  Therapeutic Exercise Activity 10: hooklying hip abd w/ blue band x 20 repetitions (not today)  Wedge bilateral calf stretch x 2 minutes   Standing iliotibial band and QL stretch, right and left, 3x20 second hold each     Therapeutic Activity x 10 minutes:   Sit to stand, 2x10 repetitions   6" step ups, forward and lateral, right and left lower extremities x 10 repetitions each        Modalities x 10 minutes w/ stretches   Moist Heat (min): 10 min to back during supine " stretches  Electrical Stimulation (Parameters): Premod to R SIJ and R ITB in supine x 10 minutes (not today)     Assessment & Plan  Assessment: reported no increase in pain during or after treatment today; treatment appropriate for goals; required intermittent rest breaks due to fatigue after having been sick last week  Evaluation/Treatment Tolerance: Patient tolerated treatment well     Education  Education was done with Patient. The patient's learning style includes Demonstration. The patient Demonstrates understanding and Verbalizes understanding.          Issued blue band for use with home exercise program         Plan: Continue per POC and progress as pt able  Plan of Care Certification:  1/28/2025 to 3/11/2025  Planned therapy interventions include: Therapeutic exercise, Therapeutic activities, Neuromuscular re-education, and Manual therapy.    Planned modalities to include: Cryotherapy (cold pack), Thermotherapy (hot pack), Electrical stimulation - passive/unattended, and Ultrasound.      Visit Frequency: 2 times Per Week for 6 Weeks.     Goals:   Active         Changing body position         Patient will report no pain with rolling over in bed for improved quality of sleep.          Start:  01/28/25    Expected End:  03/11/25        STG               Functional outcome         Patient will have increased FOTO score by greater than or equal to 8 points to demonstrate subjective improvement.          Start:  01/28/25    Expected End:  03/11/25        LTG            Patient will demonstrate independence in home program for support of progression         Start:  01/28/25    Expected End:  03/11/25        STG              Pain         Patient will report pain of 5/10 demonstrating a reduction of overall pain.          Start:  01/28/25    Expected End:  03/11/25        STG           Patient will report pain of 3/10 demonstrating a reduction of overall pain.          Start:  01/28/25    Expected End:  03/11/25         LTG               Range of Motion         Patient will have pain free lumbar extension ROM for increased ability to transfer sit to stand without pain.          Start:  01/28/25    Expected End:  03/11/25        LTG               Strength         Patient will achieve bilateral hip flexion strength of 4+/5 for improved gait mechanics.          Start:  01/28/25    Expected End:  03/11/25        LTG

## 2025-02-20 ENCOUNTER — CLINICAL SUPPORT (OUTPATIENT)
Dept: REHABILITATION | Facility: HOSPITAL | Age: 70
End: 2025-02-20
Payer: MEDICARE

## 2025-02-20 DIAGNOSIS — M54.41 CHRONIC RIGHT-SIDED LOW BACK PAIN WITH RIGHT-SIDED SCIATICA: ICD-10-CM

## 2025-02-20 DIAGNOSIS — G89.29 CHRONIC RIGHT-SIDED LOW BACK PAIN WITH RIGHT-SIDED SCIATICA: ICD-10-CM

## 2025-02-20 DIAGNOSIS — M54.16 LUMBAR RADICULOPATHY: Primary | ICD-10-CM

## 2025-02-20 DIAGNOSIS — M79.18 MYALGIA, OTHER SITE: ICD-10-CM

## 2025-02-20 PROCEDURE — 97014 ELECTRIC STIMULATION THERAPY: CPT | Mod: CQ

## 2025-02-20 PROCEDURE — 97110 THERAPEUTIC EXERCISES: CPT | Mod: CQ

## 2025-02-20 PROCEDURE — 97010 HOT OR COLD PACKS THERAPY: CPT | Mod: CQ

## 2025-02-20 NOTE — PROGRESS NOTES
"Outpatient Rehab     Physical Therapy Visit     Patient Name: Jina Owen  MRN: [id number]  YOB: 1955  Today's Date: 2/18/2025     Therapy Diagnosis:           Encounter Diagnosis   Name Primary?    Lumbar radiculopathy Yes      Physician: David Carpenter MD     Physician Orders: Eval and Treat  Medical Diagnosis: lumbar radiculopathy and myalgia      Visit # / Visits Authorized:  6 / 12   Date of Evaluation:  1/28/2025   Insurance Authorization Period: 1/21/2025 to 1/21/2026  Plan of Care Certification:  1/28/2025 to 3/11/2025  PTA visit #: 5  Foto due visit #: 6                Time In: 0847   Time Out: 0932  Total Time: 45 minutes   Total Billable Time: 40 minutes +FOTO        Subjective  No c/o pain in back/legs today; slept well      Objective         Treatment:     Therapeutic Exercise   Therapeutic Exercise Activity 1: Nustep x 8 minutes  Therapeutic Exercise Activity 3: Supine posterior pelvic tilt 20 x 3"   Therapeutic Exercise Activity 4: Supine Lower trunk rotations 20 x 3"   Therapeutic Exercise Activity 5: Supine single knee to chest 3x20 s/h, BLE  Therapeutic Exercise Activity 6: Supine Piriformis Stretch 3x20 s/h, BLE  Therapeutic Exercise Activity 7: Supine Figure 4 stretch 3x20 s/h, BLE  Therapeutic Exercise Activity 8: supine hamstring stretch, BLE, 3x20 s/h w/ neural glide  Therapeutic Exercise Activity 9: bridging x 20 repetitions   Therapeutic Exercise Activity 10: hooklying hip abd w/ blue band x 20 repetitions         Modalities x 10 minutes   Moist Heat (min): 10 min to back during supine stretches  Electrical Stimulation (Parameters): Premod to R SIJ and R ITB in supine x 10 minutes      Assessment & Plan  Assessment: no c/o pain during or after session; treatment appropriate for goals  Evaluation/Treatment Tolerance: Patient tolerated treatment well          Plan: Continue per POC and progress as pt able  Plan of Care Certification:  1/28/2025 to 3/11/2025  Planned therapy " interventions include: Therapeutic exercise, Therapeutic activities, Neuromuscular re-education, and Manual therapy.    Planned modalities to include: Cryotherapy (cold pack), Thermotherapy (hot pack), Electrical stimulation - passive/unattended, and Ultrasound.      Visit Frequency: 2 times Per Week for 6 Weeks.     Goals:   Active         Changing body position         Patient will report no pain with rolling over in bed for improved quality of sleep.          Start:  01/28/25    Expected End:  03/11/25        STG               Functional outcome         Patient will have increased FOTO score by greater than or equal to 8 points to demonstrate subjective improvement.          Start:  01/28/25    Expected End:  03/11/25        LTG            Patient will demonstrate independence in home program for support of progression         Start:  01/28/25    Expected End:  03/11/25        STG              Pain         Patient will report pain of 5/10 demonstrating a reduction of overall pain.          Start:  01/28/25    Expected End:  03/11/25        STG           Patient will report pain of 3/10 demonstrating a reduction of overall pain.          Start:  01/28/25    Expected End:  03/11/25        LTG               Range of Motion         Patient will have pain free lumbar extension ROM for increased ability to transfer sit to stand without pain.          Start:  01/28/25    Expected End:  03/11/25        LTG               Strength         Patient will achieve bilateral hip flexion strength of 4+/5 for improved gait mechanics.          Start:  01/28/25    Expected End:  03/11/25        LTG

## 2025-02-25 ENCOUNTER — CLINICAL SUPPORT (OUTPATIENT)
Dept: REHABILITATION | Facility: HOSPITAL | Age: 70
End: 2025-02-25
Payer: MEDICARE

## 2025-02-25 DIAGNOSIS — M79.661 PAIN IN RIGHT LOWER LEG: ICD-10-CM

## 2025-02-25 DIAGNOSIS — M54.51 VERTEBROGENIC LOW BACK PAIN: Primary | ICD-10-CM

## 2025-02-25 PROCEDURE — 97110 THERAPEUTIC EXERCISES: CPT

## 2025-02-25 PROCEDURE — 97112 NEUROMUSCULAR REEDUCATION: CPT

## 2025-02-25 NOTE — PROGRESS NOTES
Outpatient Rehab    Physical Therapy Visit    Patient Name: Jina Owen  MRN: [id number]  YOB: 1955  Encounter Date: 2/25/2025    Therapy Diagnosis:   Encounter Diagnoses   Name Primary?    Vertebrogenic low back pain Yes    Pain in right lower leg      Physician: David Carpenter MD    Physician Orders: Eval and Treat  Medical Diagnosis: lumbar radiculopathy and myalgia      Visit # / Visits Authorized:  7 / 12   Date of Evaluation:  1/28/2025   Insurance Authorization Period: 1/21/2025 to 1/21/2026  Plan of Care Certification:  1/28/2025 to 3/11/2025  PTA visit #: 0/5  Foto due visit #: 6      Time In: 1015   Time Out: 1103  Total Time: 48   Total Billable Time: 48 minutes    FOTO:  Intake Score:  %  Survey Score 1:  %  Survey Score 2:  %         Subjective   Pt reports that the only pain she is having right now is some throbbing pain on lateral side of R lower leg..  Pain reported as 6/10.      Objective            Treatment:  Therapeutic Exercise  Therapeutic Exercise Activity 1: nustep x 8 min  Therapeutic Exercise Activity 2: seated hamstring stretch, 3x20 s/h  Therapeutic Exercise Activity 3: swiss ball rollouts, forward: 10 x 10 sec hold  Therapeutic Exercise Activity 4: supine sciatic nerve glide, RLE: 10 x 5 ankle pumps each  Therapeutic Exercise Activity 5: LTR: 10 x 5 sec hold, each side  Therapeutic Exercise Activity 6: single knee to chest: 3 x 20 sec hold  Therapeutic Exercise Activity 7: lower leg assessment    Balance/Neuromuscular Re-Education  Balance/Neuromuscular Re-Education Activity 1: posterior pelvic tilt: 20 x 3 sec hold  Balance/Neuromuscular Re-Education Activity 2: TrA activation, in supine: 20 x 3 sec hold    Assessment & Plan   Assessment: Assessed pt's R lower leg pain and found possible DOMS and/or peroneal nerve irritation. Discussed DOMS versus nerve entrapmant/irritation and course of healing for each. Added in sciatic nerve glides to address possible peroneal  nerve irritation. Continued with exercises/activities from previous treatment session with good tolerance. Will continue to progress as tolerated and as able.       Patient will continue to benefit from skilled outpatient physical therapy to address the deficits listed in the problem list box on initial evaluation, provide pt/family education and to maximize pt's level of independence in the home and community environment.     Patient's spiritual, cultural, and educational needs considered and patient agreeable to plan of care and goals.           Plan: Will continue to progress as able and as tolerated.    Goals:   Active       Changing body position       Patient will report no pain with rolling over in bed for improved quality of sleep.        Start:  01/28/25    Expected End:  03/11/25       STG             Functional outcome       Patient will have increased FOTO score by greater than or equal to 8 points to demonstrate subjective improvement.        Start:  01/28/25    Expected End:  03/11/25       LTG          Patient will demonstrate independence in home program for support of progression       Start:  01/28/25    Expected End:  03/11/25       STG            Pain       Patient will report pain of 5/10 demonstrating a reduction of overall pain.        Start:  01/28/25    Expected End:  03/11/25       STG         Patient will report pain of 3/10 demonstrating a reduction of overall pain.        Start:  01/28/25    Expected End:  03/11/25       LTG             Range of Motion       Patient will have pain free lumbar extension ROM for increased ability to transfer sit to stand without pain.        Start:  01/28/25    Expected End:  03/11/25       LTG             Strength       Patient will achieve bilateral hip flexion strength of 4+/5 for improved gait mechanics.        Start:  01/28/25    Expected End:  03/11/25       LTG              Harris Flores, PT, DPT  2/25/2025

## 2025-02-28 ENCOUNTER — CLINICAL SUPPORT (OUTPATIENT)
Dept: REHABILITATION | Facility: HOSPITAL | Age: 70
End: 2025-02-28
Payer: MEDICARE

## 2025-02-28 DIAGNOSIS — M54.51 VERTEBROGENIC LOW BACK PAIN: Primary | ICD-10-CM

## 2025-02-28 PROCEDURE — 97110 THERAPEUTIC EXERCISES: CPT | Mod: CQ

## 2025-02-28 PROCEDURE — 97014 ELECTRIC STIMULATION THERAPY: CPT | Mod: CQ

## 2025-02-28 NOTE — PROGRESS NOTES
"  Outpatient Rehab    Physical Therapy Visit    Patient Name: Jina Owen  MRN: [id number]  YOB: 1955  Encounter Date: 2/28/2025    Therapy Diagnosis:   No diagnosis found.    Physician: David Carpenter MD    Physician Orders: Eval and Treat  Medical Diagnosis: lumbar radiculopathy and myalgia      Visit # / Visits Authorized:  8 / 12   Date of Evaluation:  1/28/2025   Insurance Authorization Period: 1/21/2025 to 1/21/2026  Plan of Care Certification:  1/28/2025 to 3/11/2025  PTA visit #: 1/5  Foto due visit #: 6      Time In:   14:45  Time Out:  14:50   Total Time:   55 minutes   Total Billable Time: 55 minutes    FOTO:  Intake Score:  %  Survey Score 1:  %  Survey Score 2:  %         Subjective   Patient reports she was on her feet a lot over the weekend, and has had increased pain in Right lower leg since then..  Pain reported as 7/10. Right lateral thigh, Right lower leg    Objective            Treatment:   Therapeutic Exercise  Therapeutic Exercise Activity 1: nustep x 8 min  Therapeutic Exercise Activity 2: seated hamstring stretch, 3x20 s/h  Therapeutic Exercise Activity 3: Supine lower trunk rotations  Therapeutic Exercise Activity 4: Prone lumbar ext with pillow supporting x 3 minutes  Therapeutic Exercise Activity 5: Prone press ups 2 x 10  Therapeutic Exercise Activity 6: Prone Glut sets 20 x 3"  Therapeutic Exercise Activity 7: Prone heel press 20 x  Therapeutic Exercise Activity 8: Sidelying hip flexor stretch to Left and Right LEs    Modalities  Moist Heat (min): with E-stim to back with patient prone  Electrical Stimulation (Parameters): x 12 minutes Biphasic Estim to Left and Right Quadratus Lumborum and Left and Right Piriformis with patient prone      Assessment & Plan   Assessment: Patient reports feeling like she is cramping in Right anterior thigh and lower leg during attempted supine knee to chest stretches.  Changed Therapeutic exercises to focus on extension of trunk and " "posterior chain muscles.  Patient requires mod cues for technique, alignment, count, and hold times.  No remarkable tightness in Left or Right hip flexors with manual side lying stretch.  Patient states "I feel better.  I just got pain in the top of my foot" at end of PT treatment session.  No adverse effects noted.       Patient will continue to benefit from skilled outpatient physical therapy to address the deficits listed in the problem list box on initial evaluation, provide pt/family education and to maximize pt's level of independence in the home and community environment.     Patient's spiritual, cultural, and educational needs considered and patient agreeable to plan of care and goals.           Plan: Will continue to progress as able and as tolerated.    Goals:   Active       Changing body position       Patient will report no pain with rolling over in bed for improved quality of sleep.        Start:  01/28/25    Expected End:  03/11/25       STG             Functional outcome       Patient will have increased FOTO score by greater than or equal to 8 points to demonstrate subjective improvement.        Start:  01/28/25    Expected End:  03/11/25       LTG          Patient will demonstrate independence in home program for support of progression       Start:  01/28/25    Expected End:  03/11/25       STG            Pain       Patient will report pain of 5/10 demonstrating a reduction of overall pain.        Start:  01/28/25    Expected End:  03/11/25       STG         Patient will report pain of 3/10 demonstrating a reduction of overall pain.        Start:  01/28/25    Expected End:  03/11/25       LTG             Range of Motion       Patient will have pain free lumbar extension ROM for increased ability to transfer sit to stand without pain.        Start:  01/28/25    Expected End:  03/11/25       LTG             Strength       Patient will achieve bilateral hip flexion strength of 4+/5 for improved gait " mechanics.        Start:  01/28/25    Expected End:  03/11/25       LTG              Debbi Mclean, PTA  2/28/2025

## 2025-03-04 ENCOUNTER — CLINICAL SUPPORT (OUTPATIENT)
Dept: REHABILITATION | Facility: HOSPITAL | Age: 70
End: 2025-03-04
Payer: MEDICARE

## 2025-03-04 DIAGNOSIS — M79.661 PAIN IN RIGHT LOWER LEG: ICD-10-CM

## 2025-03-04 DIAGNOSIS — M54.51 VERTEBROGENIC LOW BACK PAIN: Primary | ICD-10-CM

## 2025-03-04 PROCEDURE — 97014 ELECTRIC STIMULATION THERAPY: CPT

## 2025-03-04 PROCEDURE — 97530 THERAPEUTIC ACTIVITIES: CPT

## 2025-03-04 PROCEDURE — 97110 THERAPEUTIC EXERCISES: CPT

## 2025-03-04 NOTE — PROGRESS NOTES
Outpatient Rehab    Physical Therapy Visit    Patient Name: Jina Owen  MRN: [id number]  YOB: 1955  Encounter Date: 3/4/2025    Therapy Diagnosis:   Encounter Diagnoses   Name Primary?    Vertebrogenic low back pain Yes    Pain in right lower leg      Physician: David Carpenter MD    Physician Orders: Eval and Treat  Medical Diagnosis: lumbar radiculopathy and myalgia      Visit # / Visits Authorized:  9 / 12   Date of Evaluation:  1/28/2025   Insurance Authorization Period: 1/21/2025 to 1/21/2026  Plan of Care Certification:  1/28/2025 to 3/11/2025  PTA visit #: 0/5  Foto due visit #: 12       Time In: 0930   Time Out: 1024  Total Time: 54   Total Billable Time: 54 minutes    FOTO:  Intake Score:  %  Survey Score 1:  %  Survey Score 2:  %         Subjective   Pt reports that her lower back and lower leg are doing pretty good so far today. She says that she had to drive to [place] yesterday which resulted in stiffening up of lower back. She says that when she got to her destination she had to just stand for about 2 min to loosen back up..         Objective            Treatment:  Therapeutic Exercise  Therapeutic Exercise Activity 1: nustep x 8 min  Therapeutic Exercise Activity 2: seated hamstring stretch, 3x20 s/h  Therapeutic Exercise Activity 3: seated trunk flexion stretch: 15 x 3 sec hold  Therapeutic Exercise Activity 4: wedge: 2 min  Therapeutic Exercise Activity 5: standing hip flexor stretch at steps: 3 x 20 sec hold, each LE  Therapeutic Exercise Activity 6: swiss ball rollouts, forward: 10 x 10 sec hold    Therapeutic Activity  Therapeutic Activity 1: 4-inch step ups: 15, each LE  Therapeutic Activity 2: mini squats: 15    Modalities  Moist Heat (min): with E-stim to back with patient supine  Electrical Stimulation (Parameters): 10 minutes IFC Estim to lower back in supine    Assessment & Plan   Assessment: Continued with exercises/activities from previous treatment session with  good tolerance. Added in some new functional strengthening to the LE's today via step ups and mini squats. Pt tolerated new activities well with no reports of increased pain in the lower back or LE. Pt did seem to take longer with certain exercises, so attributed extra time to CPT codes as appropriate and indicated. Will continue to progress as able and as tolerated.       Patient will continue to benefit from skilled outpatient physical therapy to address the deficits listed in the problem list box on initial evaluation, provide pt/family education and to maximize pt's level of independence in the home and community environment.     Patient's spiritual, cultural, and educational needs considered and patient agreeable to plan of care and goals.           Plan: Will continue to progress as able and as tolerated.    Goals:   Active       Changing body position       Patient will report no pain with rolling over in bed for improved quality of sleep.        Start:  01/28/25    Expected End:  03/11/25       STG             Functional outcome       Patient will have increased FOTO score by greater than or equal to 8 points to demonstrate subjective improvement.        Start:  01/28/25    Expected End:  03/11/25       LTG          Patient will demonstrate independence in home program for support of progression       Start:  01/28/25    Expected End:  03/11/25       STG            Pain       Patient will report pain of 5/10 demonstrating a reduction of overall pain.        Start:  01/28/25    Expected End:  03/11/25       STG         Patient will report pain of 3/10 demonstrating a reduction of overall pain.        Start:  01/28/25    Expected End:  03/11/25       LTG             Range of Motion       Patient will have pain free lumbar extension ROM for increased ability to transfer sit to stand without pain.        Start:  01/28/25    Expected End:  03/11/25       LTG             Strength       Patient will achieve  bilateral hip flexion strength of 4+/5 for improved gait mechanics.        Start:  01/28/25    Expected End:  03/11/25       G              Harris Flores PT, DPT  3/4/2025

## 2025-03-06 ENCOUNTER — CLINICAL SUPPORT (OUTPATIENT)
Dept: REHABILITATION | Facility: HOSPITAL | Age: 70
End: 2025-03-06
Payer: MEDICARE

## 2025-03-06 DIAGNOSIS — M79.661 PAIN IN RIGHT LOWER LEG: ICD-10-CM

## 2025-03-06 DIAGNOSIS — M54.51 VERTEBROGENIC LOW BACK PAIN: Primary | ICD-10-CM

## 2025-03-06 PROCEDURE — 97110 THERAPEUTIC EXERCISES: CPT

## 2025-03-06 PROCEDURE — 97530 THERAPEUTIC ACTIVITIES: CPT

## 2025-03-06 PROCEDURE — 97010 HOT OR COLD PACKS THERAPY: CPT

## 2025-03-06 PROCEDURE — 97014 ELECTRIC STIMULATION THERAPY: CPT

## 2025-03-06 NOTE — PROGRESS NOTES
Outpatient Rehab    Physical Therapy Visit    Patient Name: Jina Owen  MRN: [id number]  YOB: 1955  Encounter Date: 3/6/2025    Therapy Diagnosis:   Encounter Diagnoses   Name Primary?    Vertebrogenic low back pain Yes    Pain in right lower leg      Physician: David Carpenter MD    Physician Orders: Eval and Treat  Medical Diagnosis: lumbar radiculopathy and myalgia      Visit # / Visits Authorized:  10 / 12   Date of Evaluation:  1/28/2025   Insurance Authorization Period: 1/21/2025 to 1/21/2026  Plan of Care Certification:  1/28/2025 to 3/11/2025  PTA visit #: 0/5  Foto due visit #: 12       Time In: 0846   Time Out: 0945  Total Time: 59   Total Billable Time: 59 minutes    FOTO:  Intake Score:  %  Survey Score 1:  %  Survey Score 2:  %         Subjective   Pt reports that her lower back and leg are doing good this morning. She says that the only thing giving her fits is her shoulder..         Objective            Treatment:  Therapeutic Exercise  Therapeutic Exercise Activity 1: nustep x 8 min  Therapeutic Exercise Activity 2: seated hamstring stretch, 3x20 s/h  Therapeutic Exercise Activity 4: wedge: 2 min  Therapeutic Exercise Activity 5: standing hip flexor stretch at steps: 3 x 20 sec hold, each LE  Therapeutic Exercise Activity 6: swiss ball rollouts, forward: 10 x 10 sec hold  Therapeutic Exercise Activity 7: bridge: 20    Therapeutic Activity  Therapeutic Activity 1: 4-inch forward step ups: 15, each LE  Therapeutic Activity 2: 4-inch lateral step ups: 15, each LE  Therapeutic Activity 3: mini squats: 15    Modalities  Moist Heat (min): with E-stim to back with patient supine  Electrical Stimulation (Parameters): 10 minutes IFC Estim to lower back in supine    Assessment & Plan   Assessment: Added in some more functional strengthening to pt's treatment session today with good tolerance. She is also able to tolerate an increase in reps on some existing functional strengthening  activities. Will continue to progress as able and as tolerated. Also, as long as pt is doing well she could likely DC next week at end of POC.       Patient will continue to benefit from skilled outpatient physical therapy to address the deficits listed in the problem list box on initial evaluation, provide pt/family education and to maximize pt's level of independence in the home and community environment.     Patient's spiritual, cultural, and educational needs considered and patient agreeable to plan of care and goals.           Plan:      Goals:   Active       Changing body position       Patient will report no pain with rolling over in bed for improved quality of sleep.        Start:  01/28/25    Expected End:  03/11/25       STG             Functional outcome       Patient will have increased FOTO score by greater than or equal to 8 points to demonstrate subjective improvement.        Start:  01/28/25    Expected End:  03/11/25       LTG          Patient will demonstrate independence in home program for support of progression       Start:  01/28/25    Expected End:  03/11/25       STG            Pain       Patient will report pain of 5/10 demonstrating a reduction of overall pain.        Start:  01/28/25    Expected End:  03/11/25       STG         Patient will report pain of 3/10 demonstrating a reduction of overall pain.        Start:  01/28/25    Expected End:  03/11/25       LTG             Range of Motion       Patient will have pain free lumbar extension ROM for increased ability to transfer sit to stand without pain.        Start:  01/28/25    Expected End:  03/11/25       LTG             Strength       Patient will achieve bilateral hip flexion strength of 4+/5 for improved gait mechanics.        Start:  01/28/25    Expected End:  03/11/25       LTG              Harris Flores, PT, DPT  3/6/2025

## 2025-03-10 ENCOUNTER — CLINICAL SUPPORT (OUTPATIENT)
Dept: REHABILITATION | Facility: HOSPITAL | Age: 70
End: 2025-03-10
Payer: MEDICARE

## 2025-03-10 DIAGNOSIS — M79.661 PAIN IN RIGHT LOWER LEG: ICD-10-CM

## 2025-03-10 DIAGNOSIS — M54.16 LUMBAR RADICULOPATHY: ICD-10-CM

## 2025-03-10 DIAGNOSIS — M54.51 VERTEBROGENIC LOW BACK PAIN: Primary | ICD-10-CM

## 2025-03-10 PROCEDURE — 97530 THERAPEUTIC ACTIVITIES: CPT | Mod: CQ

## 2025-03-10 PROCEDURE — 97110 THERAPEUTIC EXERCISES: CPT | Mod: CQ

## 2025-03-10 PROCEDURE — 97010 HOT OR COLD PACKS THERAPY: CPT | Mod: CQ

## 2025-03-10 PROCEDURE — 97014 ELECTRIC STIMULATION THERAPY: CPT | Mod: CQ

## 2025-03-10 PROCEDURE — 97112 NEUROMUSCULAR REEDUCATION: CPT | Mod: CQ

## 2025-03-10 NOTE — PROGRESS NOTES
Outpatient Rehab    Physical Therapy Visit    Patient Name: Jina Owen  MRN: [id number]  YOB: 1955  Encounter Date: 3/10/2025    Therapy Diagnosis:   Encounter Diagnoses   Name Primary?    Vertebrogenic low back pain Yes    Pain in right lower leg     Lumbar radiculopathy      Physician: David Carpenter MD    Physician Orders: Eval and Treat  Medical Diagnosis: lumbar radiculopathy and myalgia      Visit # / Visits Authorized:  11 / 12   Date of Evaluation:  1/28/2025   Insurance Authorization Period: 1/21/2025 to 1/21/2026  Plan of Care Certification:  1/28/2025 to 3/11/2025  PTA visit #: 1/5  Foto due visit #: 12      Time In: 0845   Time Out: 0938  Total Time: 53   Total Billable Time: 53    FOTO:  Intake Score:  %  Survey Score 1:  %  Survey Score 2:  %         Subjective   feeling pretty good today; was sore after last session but states it helped her alot.  Pain reported as 0/10.      Objective          Treatment:  Therapeutic Exercise  Therapeutic Exercise Activity 1: nustep x 6 min  Therapeutic Exercise Activity 2: standing hamstring stretch, 3x20 s/h each  Therapeutic Exercise Activity 4: wedge: 2 min  Therapeutic Exercise Activity 5: standing hip flexor stretch at steps: 3 x 20 sec hold, each LE  Therapeutic Exercise Activity 6: swiss ball rollouts, forward: 10 x 10 sec hold (not today)  Therapeutic Exercise Activity 7: bridge: 20 (not today)    Balance/Neuromuscular Re-Education  Balance/Neuromuscular Re-Education Activity 3: standing BLE ex x 15 reps each: marching, mini squats, heel/toe raises, hip abd, hip ext    Therapeutic Activity  Therapeutic Activity 1: 6-inch forward step ups: 15, each LE  Therapeutic Activity 2: 6-inch lateral step ups: 15, each LE  Therapeutic Activity 3: sit to stand w/out BUE support, x10 (too tired to do 2 sets)    Modalities  Moist Heat (min): with E-stim to back with patient supine  Electrical Stimulation (Parameters): 10 minutes IFC Estim to lower back  in supine    Assessment & Plan   Assessment: doing well with increase in functional activities; no c/o post treatment; no adverse reaction from modalities noted  Evaluation/Treatment Tolerance: Patient tolerated treatment well    Patient will continue to benefit from skilled outpatient physical therapy to address the deficits listed in the problem list box on initial evaluation, provide pt/family education and to maximize pt's level of independence in the home and community environment.         Plan: Continue per POC and progress as pt able    Goals:   Active       Changing body position       Patient will report no pain with rolling over in bed for improved quality of sleep.        Start:  01/28/25    Expected End:  03/11/25       STG             Functional outcome       Patient will have increased FOTO score by greater than or equal to 8 points to demonstrate subjective improvement.        Start:  01/28/25    Expected End:  03/11/25       LTG          Patient will demonstrate independence in home program for support of progression       Start:  01/28/25    Expected End:  03/11/25       STG            Pain       Patient will report pain of 5/10 demonstrating a reduction of overall pain.        Start:  01/28/25    Expected End:  03/11/25       STG         Patient will report pain of 3/10 demonstrating a reduction of overall pain.        Start:  01/28/25    Expected End:  03/11/25       LTG             Range of Motion       Patient will have pain free lumbar extension ROM for increased ability to transfer sit to stand without pain.        Start:  01/28/25    Expected End:  03/11/25       LTG             Strength       Patient will achieve bilateral hip flexion strength of 4+/5 for improved gait mechanics.        Start:  01/28/25    Expected End:  03/11/25       LTG              Kristen Rossi, PTA

## 2025-03-11 ENCOUNTER — CLINICAL SUPPORT (OUTPATIENT)
Dept: REHABILITATION | Facility: HOSPITAL | Age: 70
End: 2025-03-11
Payer: MEDICARE

## 2025-03-11 DIAGNOSIS — M54.51 VERTEBROGENIC LOW BACK PAIN: Primary | ICD-10-CM

## 2025-03-11 PROCEDURE — 97112 NEUROMUSCULAR REEDUCATION: CPT | Mod: CQ

## 2025-03-11 PROCEDURE — 97530 THERAPEUTIC ACTIVITIES: CPT | Mod: CQ

## 2025-03-11 PROCEDURE — 97110 THERAPEUTIC EXERCISES: CPT | Mod: CQ

## 2025-03-11 NOTE — PROGRESS NOTES
"  Outpatient Rehab    Physical Therapy Visit    Patient Name: Jina Owen  MRN: [id number]  YOB: 1955  Encounter Date: 3/11/2025    Therapy Diagnosis:   Encounter Diagnosis   Name Primary?    Vertebrogenic low back pain Yes       Physician: David Carpenter MD    Physician Orders: Eval and Treat  Medical Diagnosis: lumbar radiculopathy and myalgia      Visit # / Visits Authorized:  12 / 12   Date of Evaluation:  1/28/2025   Insurance Authorization Period: 1/21/2025 to 1/21/2026  Plan of Care Certification:  1/28/2025 to 3/11/2025  PTA visit #: 2/5  Foto due visit #: 12      Time In:   8:50  Time Out:  9:40  Total Time:   50   Total Billable Time: 50    FOTO:  Intake Score:  %  Survey Score 1:  %  Survey Score 2:  %         Subjective   No new complaints.  States she feels like therapy has helped.  Says she does stretches and exercises at home she was taught in PT and it "helps with the pain"..  Pain reported as 0/10.      Objective          Treatment:  Therapeutic Exercise  Therapeutic Exercise Activity 1: nustep x 8 min  Therapeutic Exercise Activity 2: standing hamstring stretch, 3x20 s/h each  Therapeutic Exercise Activity 3: seated trunk flexion stretch: 15 x 3 sec hold  Therapeutic Exercise Activity 4: wedge: 2 min  Therapeutic Exercise Activity 5: standing hip flexor stretch at steps: 3 x 20 sec hold, each LE  Therapeutic Exercise Activity 6: swiss ball rollouts, forward: 10 x 10 sec hold (not today)  Therapeutic Exercise Activity 7: bridge: 20 (not today)  Therapeutic Exercise Activity 8: Sidelying hip flexor stretch to Left and Right LEs    Balance/Neuromuscular Re-Education  Balance/Neuromuscular Re-Education Activity 1: posterior pelvic tilt: 20 x 3 sec hold  Balance/Neuromuscular Re-Education Activity 2: TrA activation, in supine: 20 x 3 sec hold  Balance/Neuromuscular Re-Education Activity 3: standing BLE ex x 15 reps each: marching, mini squats, heel/toe raises, hip abd, hip " ext    Therapeutic Activity  Therapeutic Activity 1: 6-inch forward step ups: 15, each LE  Therapeutic Activity 2: 6-inch lateral step ups: 15, each LE  Therapeutic Activity 3: sit to stand w/out BUE support, 2 x 10    Modalities  Moist Heat (min): with E-stim to back with patient supine  Electrical Stimulation (Parameters): 10 minutes IFC Estim to lower back in supine    Assessment & Plan   Assessment:         Patient will continue to benefit from skilled outpatient physical therapy to address the deficits listed in the problem list box on initial evaluation, provide pt/family education and to maximize pt's level of independence in the home and community environment.         Plan: Plan to D/C patient from physical therapy services per PT order.    Goals:   Active       Functional outcome       Patient will have increased FOTO score by greater than or equal to 8 points to demonstrate subjective improvement.  (Unable to Meet)       Start:  01/28/25    Expected End:  03/11/25       LTG          Patient will demonstrate independence in home program for support of progression (Met)       Start:  01/28/25    Expected End:  03/11/25    Resolved:  03/12/25    STG            Strength       Patient will achieve bilateral hip flexion strength of 4+/5 for improved gait mechanics.  (Met)       Start:  01/28/25    Expected End:  03/11/25    Resolved:  03/12/25    LTG            Resolved       Changing body position       Patient will report no pain with rolling over in bed for improved quality of sleep.  (Met)       Start:  01/28/25    Expected End:  03/11/25    Resolved:  03/12/25    STG             Pain       Patient will report pain of 5/10 demonstrating a reduction of overall pain.  (Met)       Start:  01/28/25    Expected End:  03/11/25    Resolved:  03/12/25    STG         Patient will report pain of 3/10 demonstrating a reduction of overall pain.  (Met)       Start:  01/28/25    Expected End:  03/11/25    Resolved:   03/12/25    LTG             Range of Motion       Patient will have pain free lumbar extension ROM for increased ability to transfer sit to stand without pain.  (Met)       Start:  01/28/25    Expected End:  03/11/25    Resolved:  03/12/25    LTGLEN Mclean PTA

## 2025-03-12 NOTE — PROGRESS NOTES
Outpatient Rehab    Physical Therapy Discharge    Patient Name: Jina Owen  MRN: [id number]  YOB: 1955  Encounter Date: 3/11/2025    Therapy Diagnosis:        Encounter Diagnoses   Name Primary?    Vertebrogenic low back pain Yes    Pain in right lower leg      Lumbar radiculopathy        Physician: David Carpenter MD     Physician Orders: Eval and Treat  Medical Diagnosis: lumbar radiculopathy and myalgia     Visit # / Visits Authorized:  12 / 12   Date of Evaluation:  1/28/2025   Insurance Authorization Period: 1/21/2025 to 1/21/2026  Plan of Care Certification:  1/28/2025 to 3/11/2025    FOTO:  Intake Score: 54 %  Survey Score 1:51  %  Survey Score 2:53  %         Subjective         Patient reports decreased pain since beginning PT plan of care. She reports 0/10 pain.     Objective    Patient demonstrates improved lumbar and hip flexibility and improved pelvic alignment.       Assessment & Plan   Assessment:  Patient has met all PT goals except FOTO self assessment. Patient and PT agree to discharge to Home exercise program.        Patient's spiritual, cultural, and educational needs considered and patient agreeable to plan of care and goals.           Plan:  discharge to [facility]    Goals:   Active       Changing body position       Patient will report no pain with rolling over in bed for improved quality of sleep.        Start:  01/28/25    Expected End:  03/11/25       STG             Functional outcome       Patient will have increased FOTO score by greater than or equal to 8 points to demonstrate subjective improvement.        Start:  01/28/25    Expected End:  03/11/25       LTG          Patient will demonstrate independence in home program for support of progression       Start:  01/28/25    Expected End:  03/11/25       STG            Pain       Patient will report pain of 5/10 demonstrating a reduction of overall pain.        Start:  01/28/25    Expected End:  03/11/25       STG          Patient will report pain of 3/10 demonstrating a reduction of overall pain.        Start:  01/28/25    Expected End:  03/11/25       LTG             Range of Motion       Patient will have pain free lumbar extension ROM for increased ability to transfer sit to stand without pain.        Start:  01/28/25    Expected End:  03/11/25       LTG             Strength       Patient will achieve bilateral hip flexion strength of 4+/5 for improved gait mechanics.        Start:  01/28/25    Expected End:  03/11/25       LTG              Nadja Espino, PT, DPT